# Patient Record
Sex: MALE | Race: WHITE | NOT HISPANIC OR LATINO | Employment: FULL TIME | URBAN - METROPOLITAN AREA
[De-identification: names, ages, dates, MRNs, and addresses within clinical notes are randomized per-mention and may not be internally consistent; named-entity substitution may affect disease eponyms.]

---

## 2023-12-11 ENCOUNTER — APPOINTMENT (OUTPATIENT)
Dept: GENERAL RADIOLOGY | Facility: CLINIC | Age: 38
End: 2023-12-11
Attending: EMERGENCY MEDICINE
Payer: OTHER MISCELLANEOUS

## 2023-12-11 ENCOUNTER — HOSPITAL ENCOUNTER (EMERGENCY)
Facility: CLINIC | Age: 38
Discharge: HOME | End: 2023-12-11
Attending: EMERGENCY MEDICINE | Admitting: EMERGENCY MEDICINE
Payer: OTHER MISCELLANEOUS

## 2023-12-11 VITALS
HEIGHT: 70 IN | HEART RATE: 76 BPM | SYSTOLIC BLOOD PRESSURE: 136 MMHG | RESPIRATION RATE: 16 BRPM | TEMPERATURE: 98.2 F | WEIGHT: 315 LBS | BODY MASS INDEX: 45.1 KG/M2 | DIASTOLIC BLOOD PRESSURE: 82 MMHG | OXYGEN SATURATION: 96 %

## 2023-12-11 DIAGNOSIS — M25.521 RIGHT ELBOW PAIN: ICD-10-CM

## 2023-12-11 PROCEDURE — 250N000013 HC RX MED GY IP 250 OP 250 PS 637: Performed by: EMERGENCY MEDICINE

## 2023-12-11 PROCEDURE — 73080 X-RAY EXAM OF ELBOW: CPT | Mod: RT

## 2023-12-11 PROCEDURE — 73110 X-RAY EXAM OF WRIST: CPT | Mod: RT

## 2023-12-11 PROCEDURE — 99283 EMERGENCY DEPT VISIT LOW MDM: CPT | Performed by: EMERGENCY MEDICINE

## 2023-12-11 PROCEDURE — 99284 EMERGENCY DEPT VISIT MOD MDM: CPT | Performed by: EMERGENCY MEDICINE

## 2023-12-11 PROCEDURE — 73090 X-RAY EXAM OF FOREARM: CPT | Mod: RT

## 2023-12-11 RX ORDER — ACETAMINOPHEN 325 MG/1
975 TABLET ORAL ONCE
Status: DISCONTINUED | OUTPATIENT
Start: 2023-12-11 | End: 2023-12-11 | Stop reason: HOSPADM

## 2023-12-11 RX ORDER — ACETAMINOPHEN 325 MG/1
975 TABLET ORAL ONCE
Status: COMPLETED | OUTPATIENT
Start: 2023-12-11 | End: 2023-12-11

## 2023-12-11 RX ORDER — NAPROXEN 500 MG/1
500 TABLET ORAL ONCE
Status: COMPLETED | OUTPATIENT
Start: 2023-12-11 | End: 2023-12-11

## 2023-12-11 RX ADMIN — NAPROXEN 500 MG: 500 TABLET ORAL at 12:32

## 2023-12-11 ASSESSMENT — ACTIVITIES OF DAILY LIVING (ADL): ADLS_ACUITY_SCORE: 35

## 2023-12-11 NOTE — Clinical Note
Ross Washington was seen and treated in our emergency department on 12/11/2023.  He may return to work on 12/11/2023.  No lifting, holding, or carrying anything over 5lbs with right arm. Wear sling majority of the day.     If you have any questions or concerns, please don't hesitate to call.      Teresa Huggins, APRN CNP

## 2023-12-11 NOTE — ED TRIAGE NOTES
Patient reports he feels that his muscles on his Right arm are twitching after the incident.      Triage Assessment (Adult)       Row Name 12/11/23 1134          Triage Assessment    Airway WDL WDL        Respiratory WDL    Respiratory WDL WDL        Skin Circulation/Temperature WDL    Skin Circulation/Temperature WDL WDL        Cardiac WDL    Cardiac WDL WDL

## 2023-12-11 NOTE — DISCHARGE INSTRUCTIONS
Follow-up with orthopedics. You should receive a call from [facility] to schedule your follow up appointment. If you do not hear from them within 24 business hours, call [phone].    Wear the sling most of the day, take arm out to move it so it doesn't become too stiff. Avoid lifting anything with the right arm that is over 5lbs.

## 2023-12-11 NOTE — ED PROVIDER NOTES
"      [facility] EMERGENCY DEPARTMENT ([facility])    12/11/23      ED PROVIDER NOTE    History     Chief Complaint   Patient presents with    Elbow Injury     Patient was moving a sheet metal at work and heard a lot of popping sound on his Right elbow. Patient reports Right  forearm swelling.     HPI  Ross Washington is a 38 year old male who works with his hands and was lifting a heavy piece of sheet metal above his back when he felt a sudden pop on his right elbow with some pain. No direct impact or fall. Happened just prior to arrival.      This part of the medical record was transcribed by Shaina Moseley Medical Scribe, from a dictation done by Leeroy Stevens MD.     Past Medical History  History reviewed. No pertinent past medical history.  Past Surgical History:   Procedure Laterality Date    ENT SURGERY       No current outpatient medications on file.    No Known Allergies  Family History  History reviewed. No pertinent family history.  Social History   Social History     Tobacco Use    Smoking status: Every Day     Packs/day: .5     Types: Cigarettes   Substance Use Topics    Alcohol use: Yes     Comment: socially    Drug use: No      Past medical history, past surgical history, medications, allergies, family history, and social history were reviewed with the patient. No additional pertinent items.      A medically appropriate review of systems was performed with pertinent positives and negatives noted in the HPI, and all other systems negative.    Physical Exam     BP: 136/82  Pulse: 76  Temp: 98.2  F (36.8  C)  Resp: 16  Height: 177.8 cm (5' 10\")  Weight: (!) 152 kg (335 lb)  SpO2: 96 %    Physical Exam  Patient has sensation and pulses intact, right hand and wrist cap refill normal.   Pain upon supination and pronation as well as flexion of elbow upon his ulnar portion of proximal forearm. Minimal point tenderness on popliteal fossa. No tenderness on olecranon or fingers.   Non-tender " shoulder, normal range of motion.    This part of the medical record was transcribed by Shaina Moseley, Medical Scribe, from a dictation done by Leeroy Stevens MD.     ED Course, Procedures, & Data     ED Course as of 12/11/23 1522   Mon Dec 11, 2023   1328 Patient fitted with sling to right arm, instructed on use including removing periodically throughout day to perform ROM. Instructed to take tylenol and/or ibuprofen for pain as well as apply ice to areas of pain for comfort. Provided with work note and work specific paperwork provided by patient was filled out and returned to him stating 5lb weight restriction right arm. All patient questions answered prior to his discharge.      Procedures                 Results for orders placed or performed during the hospital encounter of 12/11/23   XR Elbow Right G/E 3 Views     Status: None    Narrative    XR ELBOW RIGHT G/E 3 VIEWS   12/11/2023 12:52 PM     HISTORY: Injury, pain  COMPARISON: None.       Impression    IMPRESSION: Normal joint spaces and alignment. No fracture.    CRISTOBAL CASE MD         SYSTEM ID:  [id number]   XR Wrist Right G/E 3 Views     Status: None    Narrative    XR WRIST RIGHT G/E 3 VIEWS   12/11/2023 12:52 PM     HISTORY: Pain  COMPARISON: None.       Impression    IMPRESSION: Likely old healed fracture of the fifth metacarpal base  and the adjacent distal hamate bone with superimposed mild  degenerative arthritis. No acute appearing fracture.    CRISTOBAL CASE MD         SYSTEM ID:  [id number]   Radius/Ulna XR, PA & LAT, right     Status: None    Narrative    XR FOREARM RIGHT 2 VIEWS 12/11/2023 12:52 PM     HISTORY: R atraumatic elbow pain  COMPARISON: None.       Impression    IMPRESSION: Negative forearm. No acute fracture.    CRISTOBAL CASE MD         SYSTEM ID:  [id number]     Medications   acetaminophen (TYLENOL) tablet 975 mg ( Oral Canceled Entry 12/11/23 1236)   acetaminophen (TYLENOL) tablet 975 mg (0 mg Oral Return to  Cabinet 12/11/23 1232)   naproxen (NAPROSYN) tablet 500 mg (500 mg Oral $Given 12/11/23 1232)     Labs Ordered and Resulted from Time of ED Arrival to Time of ED Departure - No data to display  XR Elbow Right G/E 3 Views   Final Result   IMPRESSION: Normal joint spaces and alignment. No fracture.      CRISTOBAL CASE MD            SYSTEM ID:  [id number]      Radius/Ulna XR, PA & LAT, right   Final Result   IMPRESSION: Negative forearm. No acute fracture.      CRISTOBAL CASE MD            SYSTEM ID:  [id number]      XR Wrist Right G/E 3 Views   Final Result   IMPRESSION: Likely old healed fracture of the fifth metacarpal base   and the adjacent distal hamate bone with superimposed mild   degenerative arthritis. No acute appearing fracture.      CRISTOBAL CASE MD            SYSTEM ID:  [id number]             Critical care was not performed.     Medical Decision Making  The patient's presentation was of moderate complexity (an acute complicated injury).    The patient's evaluation involved:  independent interpretation of testing performed by another health professional (x-ray)    The patient's management necessitated moderate risk (prescription drug management including medications given in the ED).    Assessment & Plan      Concern for right elbow dislocation or tenderness, ligamentous rupture, or partial rupture given there were no direct traumatic events. X-rays of right wrist forearm and elbow. Extra strength strength Tylenol and NSAIDs given. I offered opioid pain medication which patient declined.     12:30 PM Reassessment: I independently reviewed the elbow x-ray imaging showing no signs of acute fracture or elbow dislocation. I counseled the patient regarding my concern for a tendinous/ligamentous rupture. We placed the patient in a sling for comfort and provided discharge referral to orthopedics. Counseled patient regarding nonweightbearing behavior and filled out work note. Plan to discharge with  orthopedic follow-up.    This part of the medical record was transcribed by Shaina Moseley, Medical Scribe, from a dictation done by Leeroy Stevens MD.     I have reviewed the nursing notes. I have reviewed the findings, diagnosis, plan and need for follow up with the patient.    There are no discharge medications for this patient.      Final diagnoses:   Right elbow pain     Leeroy Stevens MD  [facility] EMERGENCY DEPARTMENT  12/11/2023     Leeroy Stevens MD  12/11/23 3125

## 2023-12-12 ENCOUNTER — TELEPHONE (OUTPATIENT)
Dept: ORTHOPEDICS | Facility: CLINIC | Age: 38
End: 2023-12-12

## 2023-12-21 ENCOUNTER — OFFICE VISIT (OUTPATIENT)
Dept: ORTHOPEDICS | Facility: CLINIC | Age: 38
End: 2023-12-21
Payer: OTHER MISCELLANEOUS

## 2023-12-21 ENCOUNTER — ANCILLARY PROCEDURE (OUTPATIENT)
Dept: GENERAL RADIOLOGY | Facility: CLINIC | Age: 38
End: 2023-12-21
Attending: PEDIATRICS
Payer: OTHER MISCELLANEOUS

## 2023-12-21 VITALS — BODY MASS INDEX: 48.07 KG/M2 | WEIGHT: 315 LBS

## 2023-12-21 DIAGNOSIS — S59.901A ELBOW INJURY, RIGHT, INITIAL ENCOUNTER: Primary | ICD-10-CM

## 2023-12-21 DIAGNOSIS — S59.901A ELBOW INJURY, RIGHT, INITIAL ENCOUNTER: ICD-10-CM

## 2023-12-21 PROCEDURE — 73080 X-RAY EXAM OF ELBOW: CPT | Mod: TC | Performed by: RADIOLOGY

## 2023-12-21 PROCEDURE — 99204 OFFICE O/P NEW MOD 45 MIN: CPT | Performed by: PEDIATRICS

## 2023-12-21 NOTE — LETTER
December 21, 2023      Ross Washington  [address]  [place] [address]        To Whom It May Concern,     Ross was seen for right elbow injury.  He can return to work with the following restrictions:  - Light duty only  - Minimal use of right arm - No lifting and no repetitive motions with right arm    Obtain MRI and follow up in clinic  Anticipate ~ 3 weeks.      Sincerely,        Thao Sierra MD

## 2023-12-21 NOTE — LETTER
12/21/2023         RE: Ross Washington  [address]  [place] [address]        Dear Colleague,    Thank you for referring your patient, Ross Washington, to the [facility]. Please see a copy of my visit note below.    ASSESSMENT & PLAN    Ross was seen today for injury.    Diagnoses and all orders for this visit:    Elbow injury, right, initial encounter  -     XR Elbow Right G/E 3 Views; Future  -     MR Elbow Right w/o Contrast; Future      This issue is acute and Unchanged.        ICD-10-CM    1. Elbow injury, right, initial encounter  S59.901A XR Elbow Right G/E 3 Views     MR Elbow Right w/o Contrast        Patient Instructions   We discussed these other possible diagnosis: Right elbow injury, given concern for distal biceps injury, will obtain MRI to evaluate. Discussed supportive care in the interim.    Plan:  - Today's Plan of Care:  MRI of the Right Elbow - Call [phone] to schedule MRI   Sling for comfort    Continue with relative rest and activity modification, Ice, Compression, and Elevation.  Can apply ice 10-15 minutes 3-4 times per day as needed. OTC medications as needed.    Work Letter    -We also discussed other future treatment options:  Referral to Orthopedic Surgery  Referral to Occupational Therapy    Follow Up: In clinic with Dr. Sierra after MRI (wait at least 1-2 days)     Concerning signs and symptoms were reviewed and all questions were answered at this time.    Thao Sierra MD [facility]  Sports Medicine Physician  [facility]    -----  Chief Complaint   Patient presents with     Right Elbow - Injury       SUBJECTIVE  Ross Washington is a/an 38 year old male who is seen as an ER referral for evaluation of right elbow pain.   Note; Will need workability note. This is a WC injury     The patient is seen by themselves.  The patient is Right handed    Onset: 12/11/23. Patient describes injury as picking up a sheet of 8x 5  material and with the right arm was more extended, he went to pull it up overhead and felt 3 pops in his elbow  Location of Pain: right elbow; distal bicep, flexor tendon attachment in forearm   Worsened by: elbow, flexion extension, pronation, supination, reaching, and pulling   Better with: leaving arm alone, resting  Treatments tried: previous imaging, and casting/splinting/bracing  Associated symptoms: swelling, weakness of right elbow/forearm, feeling of instability, and muscular deformity    Orthopedic/Surgical history: NO  Social History/Occupation: working as        REVIEW OF SYSTEMS:  Review of Systems    OBJECTIVE:  Wt (!) 152 kg (335 lb)   BMI 48.07 kg/m     General: healthy, alert and in no distress  Skin: no suspicious lesions or rash.  CV: distal perfusion intact   Resp: normal respiratory effort without conversational dyspnea   Psych: normal mood and affect  Gait: NORMAL  Neuro: Normal light sensory exam of upper extremity    Right Elbow exam:    Inspection:     ecchymosis and edema right elbow    Tender:     antecubital space right    Non-Tender:      remainder of the elbow bilaterally    ROM:      Slightly decreased full ROM right elbow    Strength:     flexion 4/5 right       extension 4/5 right       forearm supination 4/5 right       forearm pronation 4/5 right       pain with resisted elbow flexion right    Sensation:     intact throughout hand       intact throughout forearm      RADIOLOGY:  Final results and radiologist's interpretation, available in the [facility] health record.  Images were reviewed with the patient in the office today.  My personal interpretation of the performed imaging:    3 XR views of right elbow reviewed along with ED X-rays (elbow, forearm, wrist): no acute bony abnormality, no significant degenerative change, likely old healed 5th metacarpal fracture  - will follow official read    Reviewed ED note  Review of the result(s) of each unique test - XR              Again, thank you for allowing me to participate in the care of your patient.        Sincerely,        Thao Sierra MD

## 2023-12-21 NOTE — PROGRESS NOTES
ASSESSMENT & PLAN    Ross was seen today for injury.    Diagnoses and all orders for this visit:    Elbow injury, right, initial encounter  -     XR Elbow Right G/E 3 Views; Future  -     MR Elbow Right w/o Contrast; Future      This issue is acute and Unchanged.        ICD-10-CM    1. Elbow injury, right, initial encounter  S59.901A XR Elbow Right G/E 3 Views     MR Elbow Right w/o Contrast        Patient Instructions   We discussed these other possible diagnosis: Right elbow injury, given concern for distal biceps injury, will obtain MRI to evaluate. Discussed supportive care in the interim.    Plan:  - Today's Plan of Care:  MRI of the Right Elbow - Call [phone] to schedule MRI   Sling for comfort    Continue with relative rest and activity modification, Ice, Compression, and Elevation.  Can apply ice 10-15 minutes 3-4 times per day as needed. OTC medications as needed.    Work Letter    -We also discussed other future treatment options:  Referral to Orthopedic Surgery  Referral to Occupational Therapy    Follow Up: In clinic with Dr. Sierra after MRI (wait at least 1-2 days)     Concerning signs and symptoms were reviewed and all questions were answered at this time.    Thao Sierra MD [facility]  Sports Medicine Physician  [facility]    -----  Chief Complaint   Patient presents with    Right Elbow - Injury       SUBJECTIVE  Ross Washington is a/an 38 year old male who is seen as an ER referral for evaluation of right elbow pain.   Note; Will need workability note. This is a WC injury     The patient is seen by themselves.  The patient is Right handed    Onset: 12/11/23. Patient describes injury as picking up a sheet of 8x 5 material and with the right arm was more extended, he went to pull it up overhead and felt 3 pops in his elbow  Location of Pain: right elbow; distal bicep, flexor tendon attachment in forearm   Worsened by: elbow, flexion extension, pronation, supination, reaching,  and pulling   Better with: leaving arm alone, resting  Treatments tried: previous imaging, and casting/splinting/bracing  Associated symptoms: swelling, weakness of right elbow/forearm, feeling of instability, and muscular deformity    Orthopedic/Surgical history: NO  Social History/Occupation: working as        REVIEW OF SYSTEMS:  Review of Systems    OBJECTIVE:  Wt (!) 152 kg (335 lb)   BMI 48.07 kg/m     General: healthy, alert and in no distress  Skin: no suspicious lesions or rash.  CV: distal perfusion intact   Resp: normal respiratory effort without conversational dyspnea   Psych: normal mood and affect  Gait: NORMAL  Neuro: Normal light sensory exam of upper extremity    Right Elbow exam:    Inspection:     ecchymosis and edema right elbow    Tender:     antecubital space right    Non-Tender:      remainder of the elbow bilaterally    ROM:      Slightly decreased full ROM right elbow    Strength:     flexion 4/5 right       extension 4/5 right       forearm supination 4/5 right       forearm pronation 4/5 right       pain with resisted elbow flexion right    Sensation:     intact throughout hand       intact throughout forearm      RADIOLOGY:  Final results and radiologist's interpretation, available in the [facility] health record.  Images were reviewed with the patient in the office today.  My personal interpretation of the performed imaging:    3 XR views of right elbow reviewed along with ED X-rays (elbow, forearm, wrist): no acute bony abnormality, no significant degenerative change, likely old healed 5th metacarpal fracture  - will follow official read    Reviewed ED note  Review of the result(s) of each unique test - XR

## 2023-12-21 NOTE — PATIENT INSTRUCTIONS
We discussed these other possible diagnosis: Right elbow injury, given concern for distal biceps injury, will obtain MRI to evaluate. Discussed supportive care in the interim.    Plan:  - Today's Plan of Care:  MRI of the Right Elbow - Call [phone] to schedule MRI   Sling for comfort    Continue with relative rest and activity modification, Ice, Compression, and Elevation.  Can apply ice 10-15 minutes 3-4 times per day as needed. OTC medications as needed.    Work Letter    -We also discussed other future treatment options:  Referral to Orthopedic Surgery  Referral to Occupational Therapy    Follow Up: In clinic with Dr. Sierra after MRI (wait at least 1-2 days)     If you have any further questions for your physician or physician s care team you can call [phone] and use option 3 to leave a voice message.

## 2023-12-22 ENCOUNTER — TELEPHONE (OUTPATIENT)
Dept: ORTHOPEDICS | Facility: CLINIC | Age: 38
End: 2023-12-22

## 2023-12-22 NOTE — TELEPHONE ENCOUNTER
[facility] Call Center    Phone Message    May a detailed message be left on voicemail: yes     Reason for Call: Other: Patient is calling in because he needs his most recent office visit notes and MRI order sent to his work comp insurance provider via fax at [phone]. Patient's claim number is [id number]     Action Taken: Other: [id number]    Travel Screening: Not Applicable

## 2023-12-26 NOTE — TELEPHONE ENCOUNTER
The requested records were printed out and faxed to [phone].      Evita Rivers, BRETT, LAT, ATC  Certified Athletic Trainer

## 2024-01-10 ENCOUNTER — ANCILLARY PROCEDURE (OUTPATIENT)
Dept: MRI IMAGING | Facility: CLINIC | Age: 39
End: 2024-01-10
Attending: PEDIATRICS
Payer: OTHER MISCELLANEOUS

## 2024-01-10 DIAGNOSIS — S59.901A ELBOW INJURY, RIGHT, INITIAL ENCOUNTER: ICD-10-CM

## 2024-01-10 PROCEDURE — 73221 MRI JOINT UPR EXTREM W/O DYE: CPT | Mod: RT | Performed by: RADIOLOGY

## 2024-01-11 ENCOUNTER — TELEPHONE (OUTPATIENT)
Dept: ORTHOPEDICS | Facility: CLINIC | Age: 39
End: 2024-01-11

## 2024-01-11 DIAGNOSIS — S59.901A ELBOW INJURY, RIGHT, INITIAL ENCOUNTER: Primary | ICD-10-CM

## 2024-01-11 NOTE — TELEPHONE ENCOUNTER
Patient returned the phone call, he is informed of his MRI and next steps.  He is informed of the order as well.    Kathy Chavis ATC, CSCS  Dr. Sierra's Clinical Coordinator  [facility] Medicine Team

## 2024-01-11 NOTE — TELEPHONE ENCOUNTER
Please call patient with MRI results:    MR Elbow Right w/o Contrast  Result Date: 1/11/2024  MRI of right elbow without contrast 1/11/2024 7:27 AM History: eval right elbow bicep rupture; Elbow injury, right, initial encounter Techniques: Multiplanar multisequence imaging through the right elbow were obtained without administration of intravenous gadolinium contrast. Comparison: 12/21/2023 Findings: Medial compartment Ulnar collateral ligament: Thickened ulnar collateral ligament, likely sequela of remote trauma. Common flexor tendon: Intact without tendinosis. Medial epicondyle: No edema. Lateral compartment Radial collateral ligament: Thickening of the radial collateral ligament, likely sequela of remote trauma. Lateral ulnar collateral ligament: Intact. Radial annular ligament: Intact. Common extensor tendon: Tendinosis with low-grade intrasubstance tear at the proximal attachment. Lateral epicondyle: No edema. Posterior compartment Triceps: Intact without tendinosis. Olecranon: No edema. Bursitis: No significant fluid. Anterior compartment Biceps: On a background severe tendinosis and/or strain, high-grade/near complete partial tear of the distal biceps tendon at/adjacent to the distal attachment. Proximal tendon thickening approximately 3 cm from the radial attachment consistent with partial retraction of torn fibers without tendon gap. Mild myotendinous junction edema. Edema along lacertus fibrosus. Reactive edema at the radial tuberosity. Brachialis: Intact. Bicipitoradial bursa: Small fluid, presumably reactive. Articulations No evidence of erosion. No significant effusion. Bones (other than subarticular marrow): No evidence of fracture, marrow contusion or marrow infiltrative change. Others: Nerves: Ulnar nerve and cubital tunnel are normal. 6 mm short axis medial epitrochlear lymph node. Mild posterior subcutaneous edema.   Impression: 1. On a background severe tendinosis and/or strain, high-grade/near  complete partial tear of the distal biceps tendon at/adjacent to the distal attachment. *  Proximal tendon thickening approximately 3 cm from the radial attachment consistent with partial retraction of torn fibers without tendon gap. *  Reactive radial edema. *  Reactive trace bicipitoradial bursitis. *  Lacertus fibrosus strain. *  Myotendinous junction strain.  2. 6 mm short axis medial epitrochlear lymph node, nonspecific. 3. Tendinosis with low-grade intrasubstance tear of the common extensor tendon at the proximal attachment. [employer]   SYSTEM ID:  [id number]      In Summary:  - Given high grade near complete distal biceps tear, would recommend referral to orthopedic surgery  - Please place referral, 3-5 day referral patient should be seen within the next week  - Continue Sling for comfort in the interim  - Let me know if additional work note is needed      Thao Sierra MD

## 2024-01-11 NOTE — TELEPHONE ENCOUNTER
Called, LVM regarding MRI and next steps.       In Summary:  - Given high grade near complete distal biceps tear, would recommend referral to orthopedic surgery  - Please place referral, 3-5 day referral patient should be seen within the next week  - Continue Sling for comfort in the interim  - Let me know if additional work note is needed      Orders are placed for surgical consultation .    Kathy Chavis ATC, CSCS  Dr. Sierra's Clinical Coordinator  [facility] Sports Medicine Team

## 2024-01-12 ENCOUNTER — TELEPHONE (OUTPATIENT)
Dept: ORTHOPEDICS | Facility: CLINIC | Age: 39
End: 2024-01-12

## 2024-01-12 NOTE — TELEPHONE ENCOUNTER
DIAGNOSIS: Elbow injury, right, initial encounter    APPOINTMENT DATE: 1/15/2024    NOTES STATUS DETAILS   OFFICE NOTE from referring provider Internal   Thao Sierra MD      OFFICE NOTE from other specialist Internal    LABS     MRI Internal MRI Elbow 1/10/2024    XRAYS (IMAGES & REPORTS) Internal Xray Elbow 12/21/2023  Xray Elbow 12/11/2023  Xray Forearm 12/11/2023  Xray Wrist 12/11/2023

## 2024-01-12 NOTE — RESULT ENCOUNTER NOTE
Patient was called and results were reviewed over the phone.    Thao Sierra MD CAQ  Primary Care Sports Medicine  [facility]

## 2024-01-15 ENCOUNTER — TELEPHONE (OUTPATIENT)
Dept: ORTHOPEDICS | Facility: CLINIC | Age: 39
End: 2024-01-15

## 2024-01-15 ENCOUNTER — OFFICE VISIT (OUTPATIENT)
Dept: ORTHOPEDICS | Facility: CLINIC | Age: 39
End: 2024-01-15
Attending: PEDIATRICS
Payer: OTHER MISCELLANEOUS

## 2024-01-15 ENCOUNTER — PRE VISIT (OUTPATIENT)
Dept: ORTHOPEDICS | Facility: CLINIC | Age: 39
End: 2024-01-15

## 2024-01-15 DIAGNOSIS — S59.901A ELBOW INJURY, RIGHT, INITIAL ENCOUNTER: ICD-10-CM

## 2024-01-15 DIAGNOSIS — S46.211A RUPTURE OF RIGHT DISTAL BICEPS TENDON, INITIAL ENCOUNTER: Primary | ICD-10-CM

## 2024-01-15 PROCEDURE — 99204 OFFICE O/P NEW MOD 45 MIN: CPT | Performed by: ORTHOPAEDIC SURGERY

## 2024-01-15 NOTE — LETTER
1/15/2024         RE: Ross Washington  [address]  [place] [address]        Dear Colleague,    Thank you for referring your patient, Ross Washington, to the [facility]. Please see a copy of my visit note below.    CHIEF COMPLAINT: right elbow pain     DIAGNOSIS: Right elbow partial high-grade distal biceps tendon tear    OCCUPATION/SPORT:      HPI:  Ross is a very pleasant 38 year old, right-hand dominant male who presents for evaluation of right elbow pain. Symptoms started on 12/11/23. There was a precipitating event where was picking up a sheet of 8x 5 material and with the right arm was more extended, he went to pull it up overhead and felt 3 pops in his elbow. This happened at work and is a work comp injury.  The pain is located to the anterior elbow. He states that he has more an odd feeling instead of pain, and current pain is rated at 0 of 10. Symptoms are worsened by movement and usage. Symptoms are improved with rest. Patient has tried sling with minimal relief. Associated symptoms include tender, pulsing pain, weakness instability and muscle deformity. Notably, the patient has had not had a history of surgery on the elbow. No other concerns or complaints at this time.     PAST MEDICAL HISTORY:  No past medical history on file.    PAST SURGICAL HISTORY:  Past Surgical History:   Procedure Laterality Date    ENT SURGERY         CURRENT MEDICATIONS:  No current outpatient medications on file.       ALLERGIES:    No Known Allergies      FAMILY HISTORY: No pertinent family history, reviewed in EMR.    SOCIAL HISTORY:   Social History     Socioeconomic History    Marital status: Single     Spouse name: Not on file    Number of children: Not on file    Years of education: Not on file    Highest education level: Not on file   Occupational History    Not on file   Tobacco Use    Smoking status: Every Day     Packs/day: .5     Types: Cigarettes     Smokeless tobacco: Not on file   Substance and Sexual Activity    Alcohol use: Yes     Comment: socially    Drug use: No    Sexual activity: Not on file   Other Topics Concern    Not on file   Social History Narrative    Not on file     Social Determinants of Health     Financial Resource Strain: Not on file   Food Insecurity: Not on file   Transportation Needs: Not on file   Physical Activity: Not on file   Stress: Not on file   Social Connections: Not on file   Interpersonal Safety: Not on file   Housing Stability: Not on file       REVIEW OF SYSTEMS: Positive for that noted in past medical history and history of present illness and otherwise reviewed in EMR    PHYSICAL EXAM:  Patient is Data Unavailable and weighs 0 lbs 0 oz. There were no vitals taken for this visit.  Constitutional: Well-developed, well-nourished, healthy appearing male.  Skin: Warm, dry   HEENT: Normal  Cardiac: Well perfused extremities, strong 2+ peripheral pulses. No edema.   Pulmonary: Breathing room air    Musculoskeletal:   Right elbow:  Full passive range of motion from 0 to able to touch the shoulders, full pronation and supination  Can get a hook exam however tender to palpation  4/5 elbow flexion and supination strength  Neurovascular exam and cervical spine exam are normal.    IMAGING:  I personally reviewed the prior x-rays and MRI which demonstrate a high-grade near thickness distal biceps tendon tear     ASSESSMENT/SURGICAL DIAGNOSIS: 38 year old-year-old right hand dominant male with right elbow distal biceps tendon tear  PLAN:  I had a nice discussion with Ross today. I reviewed the diagnosis and prognosis of a distal biceps tendon tear.  We discussed options including conservative versus surgical intervention conservatively we discussed activity modification, avoidance, and therapy.  We discussed risk of this being a 30% loss of elbow flexion strength and 40% loss of supination strength.  We alternatively discussed surgery  in the form of an open distal biceps tendon repair.  We discussed the risk and benefits of surgery as well as the anticipated rehab course.  Specific risks discussed included the risks of tendon retear, infection, LABC injury, PIN injury, stiffness, need for revision surgery.  Especially discussed the increased risks of complications in patients were smoking, extensive time was spent in counseling on smoking cessation.  Patient does wish to proceed for surgery, we discussed doing this in the next few weeks to prevent retraction of the tear, discussed that this is always a possibility of having an irreparable tear.    After going over these options, Ross would like to proceed with right elbow distal biceps repair and related procedures. We reviewed the risks and benefits of surgery including but not limited to the following: Risk of anesthesia, including death; infection; nerve/tendon/vessel injury; deep venous thrombosis (DVT), pulmonary embolism (PE); elbow stiffness, infection requiring implant removal, bleeding requiring transfusion, nerve and blood vessel injury, periprosthetic fracture, implant failure or loosening requiring revision surgery; hardware- related problems; potential for continued pain after surgery; and the potential need for further surgery in the future.     After going over these risks, benefits and alternatives Ross would like to proceed with surgery. All of his questions were answered satisfactorily and he signed the surgical consent form to proceed. All appropriate paperwork was completed and he will work with our surgical scheduling department to coordinate the surgery.    At the conclusion of the office visit, Ross verbally acknowledged that I answered all questions satisfactorily.    Sincerely,        DANIEL CHISHOLM MD

## 2024-01-15 NOTE — TELEPHONE ENCOUNTER
Date Scheduled: 1-30-24  Facility: Surgery Locations: [facility]  Surgeon: Dr. Izaguirre   Post-op appointment scheduled: 2-7-24   scheduled?: No  Surgery packet/instructions confirmed received?  Yes  Pre op physical/PAC appointment: 1/18/24 [place]  Special Considerations:     Work Comp insurance.

## 2024-01-15 NOTE — PROGRESS NOTES
CHIEF COMPLAINT: right elbow pain     DIAGNOSIS: Right elbow partial high-grade distal biceps tendon tear    OCCUPATION/SPORT:      HPI:  Ross is a very pleasant 38 year old, right-hand dominant male who presents for evaluation of right elbow pain. Symptoms started on 12/11/23. There was a precipitating event where was picking up a sheet of 8x 5 material and with the right arm was more extended, he went to pull it up overhead and felt 3 pops in his elbow. This happened at work and is a work comp injury.  The pain is located to the anterior elbow. He states that he has more an odd feeling instead of pain, and current pain is rated at 0 of 10. Symptoms are worsened by movement and usage. Symptoms are improved with rest. Patient has tried sling with minimal relief. Associated symptoms include tender, pulsing pain, weakness instability and muscle deformity. Notably, the patient has had not had a history of surgery on the elbow. No other concerns or complaints at this time.     PAST MEDICAL HISTORY:  No past medical history on file.    PAST SURGICAL HISTORY:  Past Surgical History:   Procedure Laterality Date    ENT SURGERY         CURRENT MEDICATIONS:  No current outpatient medications on file.       ALLERGIES:    No Known Allergies      FAMILY HISTORY: No pertinent family history, reviewed in EMR.    SOCIAL HISTORY:   Social History     Socioeconomic History    Marital status: Single     Spouse name: Not on file    Number of children: Not on file    Years of education: Not on file    Highest education level: Not on file   Occupational History    Not on file   Tobacco Use    Smoking status: Every Day     Packs/day: .5     Types: Cigarettes    Smokeless tobacco: Not on file   Substance and Sexual Activity    Alcohol use: Yes     Comment: socially    Drug use: No    Sexual activity: Not on file   Other Topics Concern    Not on file   Social History Narrative    Not on file     Social Determinants of Health      Financial Resource Strain: Not on file   Food Insecurity: Not on file   Transportation Needs: Not on file   Physical Activity: Not on file   Stress: Not on file   Social Connections: Not on file   Interpersonal Safety: Not on file   Housing Stability: Not on file       REVIEW OF SYSTEMS: Positive for that noted in past medical history and history of present illness and otherwise reviewed in EMR    PHYSICAL EXAM:  Patient is Data Unavailable and weighs 0 lbs 0 oz. There were no vitals taken for this visit.  Constitutional: Well-developed, well-nourished, healthy appearing male.  Skin: Warm, dry   HEENT: Normal  Cardiac: Well perfused extremities, strong 2+ peripheral pulses. No edema.   Pulmonary: Breathing room air    Musculoskeletal:   Right elbow:  Full passive range of motion from 0 to able to touch the shoulders, full pronation and supination  Can get a hook exam however tender to palpation  4/5 elbow flexion and supination strength  Neurovascular exam and cervical spine exam are normal.    IMAGING:  I personally reviewed the prior x-rays and MRI which demonstrate a high-grade near thickness distal biceps tendon tear     ASSESSMENT/SURGICAL DIAGNOSIS: 38 year old-year-old right hand dominant male with right elbow distal biceps tendon tear  PLAN:  I had a nice discussion with Ross today. I reviewed the diagnosis and prognosis of a distal biceps tendon tear.  We discussed options including conservative versus surgical intervention conservatively we discussed activity modification, avoidance, and therapy.  We discussed risk of this being a 30% loss of elbow flexion strength and 40% loss of supination strength.  We alternatively discussed surgery in the form of an open distal biceps tendon repair.  We discussed the risk and benefits of surgery as well as the anticipated rehab course.  Specific risks discussed included the risks of tendon retear, infection, LABC injury, PIN injury, stiffness, need for  revision surgery.  Especially discussed the increased risks of complications in patients were smoking, extensive time was spent in counseling on smoking cessation.  Patient does wish to proceed for surgery, we discussed doing this in the next few weeks to prevent retraction of the tear, discussed that this is always a possibility of having an irreparable tear.    After going over these options, Ross would like to proceed with right elbow distal biceps repair and related procedures. We reviewed the risks and benefits of surgery including but not limited to the following: Risk of anesthesia, including death; infection; nerve/tendon/vessel injury; deep venous thrombosis (DVT), pulmonary embolism (PE); elbow stiffness, infection requiring implant removal, bleeding requiring transfusion, nerve and blood vessel injury, periprosthetic fracture, implant failure or loosening requiring revision surgery; hardware- related problems; potential for continued pain after surgery; and the potential need for further surgery in the future.     After going over these risks, benefits and alternatives Ross would like to proceed with surgery. All of his questions were answered satisfactorily and he signed the surgical consent form to proceed. All appropriate paperwork was completed and he will work with our surgical scheduling department to coordinate the surgery.    At the conclusion of the office visit, Ross verbally acknowledged that I answered all questions satisfactorily.

## 2024-01-18 ENCOUNTER — OFFICE VISIT (OUTPATIENT)
Dept: FAMILY MEDICINE | Facility: CLINIC | Age: 39
End: 2024-01-18
Payer: OTHER MISCELLANEOUS

## 2024-01-18 VITALS
BODY MASS INDEX: 45.1 KG/M2 | WEIGHT: 315 LBS | HEIGHT: 70 IN | SYSTOLIC BLOOD PRESSURE: 138 MMHG | TEMPERATURE: 98.2 F | OXYGEN SATURATION: 98 % | DIASTOLIC BLOOD PRESSURE: 83 MMHG | RESPIRATION RATE: 16 BRPM | HEART RATE: 78 BPM

## 2024-01-18 DIAGNOSIS — Z01.818 PREOP GENERAL PHYSICAL EXAM: Primary | ICD-10-CM

## 2024-01-18 DIAGNOSIS — S46.201A UNSPECIFIED INJURY OF MUSCLE, FASCIA AND TENDON OF OTHER PARTS OF BICEPS, RIGHT ARM, INITIAL ENCOUNTER: ICD-10-CM

## 2024-01-18 LAB
ERYTHROCYTE [DISTWIDTH] IN BLOOD BY AUTOMATED COUNT: 13 % (ref 10–15)
HCT VFR BLD AUTO: 44.4 % (ref 40–53)
HGB BLD-MCNC: 15 G/DL (ref 13.3–17.7)
MCH RBC QN AUTO: 29 PG (ref 26.5–33)
MCHC RBC AUTO-ENTMCNC: 33.8 G/DL (ref 31.5–36.5)
MCV RBC AUTO: 86 FL (ref 78–100)
PLATELET # BLD AUTO: 324 10E3/UL (ref 150–450)
RBC # BLD AUTO: 5.18 10E6/UL (ref 4.4–5.9)
WBC # BLD AUTO: 8.2 10E3/UL (ref 4–11)

## 2024-01-18 PROCEDURE — 85027 COMPLETE CBC AUTOMATED: CPT | Performed by: FAMILY MEDICINE

## 2024-01-18 PROCEDURE — 99204 OFFICE O/P NEW MOD 45 MIN: CPT | Performed by: FAMILY MEDICINE

## 2024-01-18 PROCEDURE — 36415 COLL VENOUS BLD VENIPUNCTURE: CPT | Performed by: FAMILY MEDICINE

## 2024-01-18 PROCEDURE — 80048 BASIC METABOLIC PNL TOTAL CA: CPT | Performed by: FAMILY MEDICINE

## 2024-01-18 NOTE — PATIENT INSTRUCTIONS
Preparing for Your Surgery  Getting started  A nurse will call you to review your health history and instructions. They will give you an arrival time based on your scheduled surgery time. Please be ready to share:  Your doctor's clinic name and phone number  Your medical, surgical, and anesthesia history  A list of allergies and sensitivities  A list of medicines, including herbal treatments and over-the-counter drugs  Whether the patient has a legal guardian (ask how to send us the papers in advance)  Please tell us if you're pregnant--or if there's any chance you might be pregnant. Some surgeries may injure a fetus (unborn baby), so they require a pregnancy test. Surgeries that are safe for a fetus don't always need a test, and you can choose whether to have one.   If you have a child who's having surgery, please ask for a copy of Preparing for Your Child's Surgery.    Preparing for surgery  Within 10 to 30 days of surgery: Have a pre-op exam (sometimes called an H&P, or History and Physical). This can be done at a clinic or pre-operative center.  If you're having a , you may not need this exam. Talk to your care team.  At your pre-op exam, talk to your care team about all medicines you take. If you need to stop any medicines before surgery, ask when to start taking them again.  We do this for your safety. Many medicines can make you bleed too much during surgery. Some change how well surgery (anesthesia) drugs work.  Call your insurance company to let them know you're having surgery. (If you don't have insurance, call [phone].)  Call your clinic if there's any change in your health. This includes signs of a cold or flu (sore throat, runny nose, cough, rash, fever). It also includes a scrape or scratch near the surgery site.  If you have questions on the day of surgery, call your hospital or surgery center.  Eating and drinking guidelines  For your safety: Unless your surgeon tells you otherwise,  follow the guidelines below.  Eat and drink as usual until 8 hours before you arrive for surgery. After that, no food or milk.  Drink clear liquids until 2 hours before you arrive. These are liquids you can see through, like water, Gatorade, and Propel Water. They also include plain black coffee and tea (no cream or milk), candy, and breath mints. You can spit out gum when you arrive.  If you drink alcohol: Stop drinking it the night before surgery.  If your care team tells you to take medicine on the morning of surgery, it's okay to take it with a sip of water.  Preventing infection  Shower or bathe the night before and morning of your surgery. Follow the instructions your clinic gave you. (If no instructions, use regular soap.)  Don't shave or clip hair near your surgery site. We'll remove the hair if needed.  Don't smoke or vape the morning of surgery. You may chew nicotine gum up to 2 hours before surgery. A nicotine patch is okay.  Note: Some surgeries require you to completely quit smoking and nicotine. Check with your surgeon.  Your care team will make every effort to keep you safe from infection. We will:  Clean our hands often with soap and water (or an alcohol-based hand rub).  Clean the skin at your surgery site with a special soap that kills germs.  Give you a special gown to keep you warm. (Cold raises the risk of infection.)  Wear special hair covers, masks, gowns and gloves during surgery.  Give antibiotic medicine, if prescribed. Not all surgeries need antibiotics.  What to bring on the day of surgery  Photo ID and insurance card  Copy of your health care directive, if you have one  Glasses and hearing aids (bring cases)  You can't wear contacts during surgery  Inhaler and eye drops, if you use them (tell us about these when you arrive)  CPAP machine or breathing device, if you use them  A few personal items, if spending the night  If you have . . .  A pacemaker, ICD (cardiac defibrillator) or other  implant: Bring the ID card.  An implanted stimulator: Bring the remote control.  A legal guardian: Bring a copy of the certified (court-stamped) guardianship papers.  Please remove any jewelry, including body piercings. Leave jewelry and other valuables at home.  If you're going home the day of surgery  You must have a responsible adult drive you home. They should stay with you overnight as well.  If you don't have someone to stay with you, and you aren't safe to go home alone, we may keep you overnight. Insurance often won't pay for this.  After surgery  If it's hard to control your pain or you need more pain medicine, please call your surgeon's office.  Questions?   If you have any questions for your care team, list them here: _________________________________________________________________________________________________________________________________________________________________________ ____________________________________ ____________________________________ ____________________________________  For informational purposes only. Not to replace the advice of your health care provider. Copyright   2003, 2019 [facility]. All rights reserved. Clinically reviewed by Mouna Gonzalez MD. [employer] 565551 - REV 12/22.

## 2024-01-18 NOTE — PROGRESS NOTES
Preoperative Evaluation  [facility]  [address]  [place] [address]  Phone: [phone]  Primary Provider: Saran Welch DO  Pre-op Performing Provider: SARAN WELCH  Jan 18, 2024       Ross is a 38 year old, presenting for the following:  Pre-Op Exam (Work Comp)        1/18/2024     3:43 PM   Additional Questions   Roomed by Bekah ESCALONA CMA     Surgical Information  Surgery/Procedure: Repair, tendon Bicep Distal Right  Surgery Location: [facility]  Surgeon: Dr. Izaguirre  Surgery Date: 1/30/2024  Time of Surgery: 11:30am  Where patient plans to recover: At home with family  Fax number for surgical facility: Note does not need to be faxed, will be available electronically in Epic.    Assessment & Plan     The proposed surgical procedure is considered INTERMEDIATE risk.    Problem List Items Addressed This Visit    None  Visit Diagnoses       Preop general physical exam    -  Primary    Unspecified injury of muscle, fascia and tendon of other parts of biceps, right arm, initial encounter              I did order baseline BMP, CBC as he has not had any medical care for many years, and BMI 51        - No identified additional risk factors other than previously addressed    Antiplatelet or Anticoagulation Medication Instructions   - Patient is on no antiplatelet or anticoagulation medications.    Additional Medication Instructions  Patient is on no additional chronic medications    Recommendation  APPROVAL GIVEN to proceed with proposed procedure, without further diagnostic evaluation.          Subjective       HPI related to upcoming procedure: Bicep injury lifting a sheet of metal Dec 11, 2023, requires surgical correction        1/18/2024     3:36 PM   Preop Questions   1. Have you ever had a heart attack or stroke? No   2. Have you ever had surgery on your heart or blood vessels, such as a stent placement, a coronary artery bypass, or surgery on an artery  in your head, neck, heart, or legs? No   3. Do you have chest pain with activity? No   4. Do you have a history of  heart failure? No   5. Do you currently have a cold, bronchitis or symptoms of other infection? No   6. Do you have a cough, shortness of breath, or wheezing? No   7. Do you or anyone in your family have previous history of blood clots? No   8. Do you or does anyone in your family have a serious bleeding problem such as prolonged bleeding following surgeries or cuts? No   9. Have you ever had problems with anemia or been told to take iron pills? No   10. Have you had any abnormal blood loss such as black, tarry or bloody stools? No   11. Have you ever had a blood transfusion? No   12. Are you willing to have a blood transfusion if it is medically needed before, during, or after your surgery? NO -    13. Have you or any of your relatives ever had problems with anesthesia? No   14. Do you have sleep apnea, excessive snoring or daytime drowsiness? UNKNOWN -    15. Do you have any artifical heart valves or other implanted medical devices like a pacemaker, defibrillator, or continuous glucose monitor? No   16. Do you have artificial joints? No   17. Are you allergic to latex? No       Health Care Directive  Patient does not have a Health Care Directive or Living Will: Discussed advance care planning with patient; information given to patient to review.    Preoperative Review of    reviewed - no record of controlled substances prescribed.        There are no problems to display for this patient.     No past medical history on file.  Past Surgical History:   Procedure Laterality Date    ENT SURGERY       No current outpatient medications on file.       No Known Allergies     Social History     Tobacco Use    Smoking status: Every Day     Packs/day: .5     Types: Cigarettes    Smokeless tobacco: Not on file   Substance Use Topics    Alcohol use: Yes     Comment: socially       History   Drug Use No        "  Review of Systems    Objective    /83 (BP Location: Left arm, Patient Position: Chair, Cuff Size: Adult Large)   Pulse 78   Temp 98.2  F (36.8  C) (Temporal)   Resp 16   Ht 1.778 m (5' 10\")   Wt (!) 161.5 kg (356 lb)   SpO2 98%   BMI 51.08 kg/m     Estimated body mass index is 51.08 kg/m  as calculated from the following:    Height as of this encounter: 1.778 m (5' 10\").    Weight as of this encounter: 161.5 kg (356 lb).  Physical Exam  GENERAL: alert and no distress  NECK: no adenopathy, no asymmetry, masses, or scars  RESP: lungs clear to auscultation - no rales, rhonchi or wheezes  CV: regular rate and rhythm, normal S1 S2, no S3 or S4, no murmur, click or rub, no peripheral edema  ABDOMEN: soft, nontender, no hepatosplenomegaly, no masses and bowel sounds normal  MS: no gross musculoskeletal defects noted, no edema    No results for input(s): \"HGB\", \"PLT\", \"INR\", \"NA\", \"POTASSIUM\", \"CR\", \"A1C\" in the last 55571 hours.     Diagnostics  Labs pending at this time.  Results will be reviewed when available.   No EKG required, no history of coronary heart disease, significant arrhythmia, peripheral arterial disease or other structural heart disease.    Revised Cardiac Risk Index (RCRI)  The patient has the following serious cardiovascular risks for perioperative complications:   - No serious cardiac risks = 0 points     RCRI Interpretation: 0 points: Class I (very low risk - 0.4% complication rate)         Signed Electronically by: JANET GROVE DO  Copy of this evaluation report is provided to requesting physician.         "

## 2024-01-20 LAB
ANION GAP SERPL CALCULATED.3IONS-SCNC: 10 MMOL/L (ref 7–15)
BUN SERPL-MCNC: 11.7 MG/DL (ref 6–20)
CALCIUM SERPL-MCNC: 9.5 MG/DL (ref 8.6–10)
CHLORIDE SERPL-SCNC: 105 MMOL/L (ref 98–107)
CREAT SERPL-MCNC: 0.9 MG/DL (ref 0.67–1.17)
DEPRECATED HCO3 PLAS-SCNC: 26 MMOL/L (ref 22–29)
EGFRCR SERPLBLD CKD-EPI 2021: >90 ML/MIN/1.73M2
GLUCOSE SERPL-MCNC: 88 MG/DL (ref 70–99)
POTASSIUM SERPL-SCNC: 4.3 MMOL/L (ref 3.4–5.3)
SODIUM SERPL-SCNC: 141 MMOL/L (ref 135–145)

## 2024-01-29 ENCOUNTER — ANESTHESIA EVENT (OUTPATIENT)
Dept: SURGERY | Facility: CLINIC | Age: 39
End: 2024-01-29
Payer: OTHER MISCELLANEOUS

## 2024-01-29 NOTE — ANESTHESIA PREPROCEDURE EVALUATION
"Anesthesia Pre-Procedure Evaluation    Patient: Ross Washington   MRN: [id number] : 1985        Procedure : Procedure(s):  REPAIR, TENDON, BICEPS, DISTAL RIGHT          No past medical history on file.   Past Surgical History:   Procedure Laterality Date    ENT SURGERY        No Known Allergies   Social History     Tobacco Use    Smoking status: Every Day     Packs/day: .5     Types: Cigarettes    Smokeless tobacco: Not on file   Substance Use Topics    Alcohol use: Yes     Comment: socially      Wt Readings from Last 1 Encounters:   24 (!) 161.5 kg (356 lb)        Anesthesia Evaluation            ROS/MED HX  ENT/Pulmonary:  - neg pulmonary ROS     Neurologic:  - neg neurologic ROS     Cardiovascular:  - neg cardiovascular ROS     METS/Exercise Tolerance: >4 METS    Hematologic:  - neg hematologic  ROS     Musculoskeletal:  - neg musculoskeletal ROS     GI/Hepatic:  - neg GI/hepatic ROS     Renal/Genitourinary:  - neg Renal ROS     Endo:  - neg endo ROS   (+)               Obesity,       Psychiatric/Substance Use:  - neg psychiatric ROS     Infectious Disease:  - neg infectious disease ROS     Malignancy:  - neg malignancy ROS     Other:  - neg other ROS             OUTSIDE LABS:  CBC:   Lab Results   Component Value Date    WBC 8.2 2024    HGB 15.0 2024    HCT 44.4 2024     2024     BMP:   Lab Results   Component Value Date     2024    POTASSIUM 4.3 2024    CHLORIDE 105 2024    CO2 26 2024    BUN 11.7 2024    CR 0.90 2024    GLC 88 2024     COAGS: No results found for: \"PTT\", \"INR\", \"FIBR\"  POC: No results found for: \"BGM\", \"HCG\", \"HCGS\"  HEPATIC: No results found for: \"ALBUMIN\", \"PROTTOTAL\", \"ALT\", \"AST\", \"GGT\", \"ALKPHOS\", \"BILITOTAL\", \"BILIDIRECT\", \"PRASHANTH\"  OTHER:   Lab Results   Component Value Date    EVELIN 9.5 2024       Anesthesia Plan    ASA Status:  3    NPO Status:  NPO Appropriate    Anesthesia Type: " "General.     - Airway: ETT   Induction: Intravenous.   Maintenance: Inhalation.        Consents            Postoperative Care    Pain management: Oral pain medications, Multi-modal analgesia, Peripheral nerve block (Single Shot).   PONV prophylaxis: Ondansetron (or other 5HT-3), Dexamethasone or Solumedrol     Comments:               Danny Frazier MD    I have reviewed the pertinent notes and labs in the chart from the past 30 days and (re)examined the patient.  Any updates or changes from those notes are reflected in this note.              # Severe Obesity: Estimated body mass index is 51.08 kg/m  as calculated from the following:    Height as of 1/18/24: 1.778 m (5' 10\").    Weight as of 1/18/24: 161.5 kg (356 lb).      "

## 2024-01-30 ENCOUNTER — HOSPITAL ENCOUNTER (OUTPATIENT)
Facility: CLINIC | Age: 39
Discharge: HOME | End: 2024-01-30
Attending: ORTHOPAEDIC SURGERY | Admitting: ORTHOPAEDIC SURGERY
Payer: OTHER MISCELLANEOUS

## 2024-01-30 ENCOUNTER — ANESTHESIA (OUTPATIENT)
Dept: SURGERY | Facility: CLINIC | Age: 39
End: 2024-01-30
Payer: OTHER MISCELLANEOUS

## 2024-01-30 VITALS
OXYGEN SATURATION: 93 % | RESPIRATION RATE: 19 BRPM | DIASTOLIC BLOOD PRESSURE: 84 MMHG | TEMPERATURE: 97.5 F | HEART RATE: 96 BPM | HEIGHT: 71 IN | WEIGHT: 315 LBS | SYSTOLIC BLOOD PRESSURE: 143 MMHG | BODY MASS INDEX: 44.1 KG/M2

## 2024-01-30 DIAGNOSIS — S46.211A RUPTURE OF RIGHT DISTAL BICEPS TENDON, INITIAL ENCOUNTER: Primary | ICD-10-CM

## 2024-01-30 PROCEDURE — 250N000009 HC RX 250

## 2024-01-30 PROCEDURE — 250N000011 HC RX IP 250 OP 636: Performed by: ORTHOPAEDIC SURGERY

## 2024-01-30 PROCEDURE — 250N000011 HC RX IP 250 OP 636

## 2024-01-30 PROCEDURE — 710N000010 HC RECOVERY PHASE 1, LEVEL 2, PER MIN: Performed by: ORTHOPAEDIC SURGERY

## 2024-01-30 PROCEDURE — 258N000003 HC RX IP 258 OP 636

## 2024-01-30 PROCEDURE — 250N000013 HC RX MED GY IP 250 OP 250 PS 637

## 2024-01-30 PROCEDURE — 24342 REPAIR OF RUPTURED TENDON: CPT | Mod: GC | Performed by: ORTHOPAEDIC SURGERY

## 2024-01-30 PROCEDURE — 272N000002 HC OR SUPPLY OTHER OPNP: Performed by: ORTHOPAEDIC SURGERY

## 2024-01-30 PROCEDURE — 250N000011 HC RX IP 250 OP 636: Performed by: STUDENT IN AN ORGANIZED HEALTH CARE EDUCATION/TRAINING PROGRAM

## 2024-01-30 PROCEDURE — 370N000017 HC ANESTHESIA TECHNICAL FEE, PER MIN: Performed by: ORTHOPAEDIC SURGERY

## 2024-01-30 PROCEDURE — 710N000012 HC RECOVERY PHASE 2, PER MINUTE: Performed by: ORTHOPAEDIC SURGERY

## 2024-01-30 PROCEDURE — 250N000025 HC SEVOFLURANE, PER MIN: Performed by: ORTHOPAEDIC SURGERY

## 2024-01-30 PROCEDURE — 360N000083 HC SURGERY LEVEL 3 W/ FLUORO, PER MIN: Performed by: ORTHOPAEDIC SURGERY

## 2024-01-30 PROCEDURE — C1713 ANCHOR/SCREW BN/BN,TIS/BN: HCPCS | Performed by: ORTHOPAEDIC SURGERY

## 2024-01-30 PROCEDURE — 999N000141 HC STATISTIC PRE-PROCEDURE NURSING ASSESSMENT: Performed by: ORTHOPAEDIC SURGERY

## 2024-01-30 PROCEDURE — 272N000001 HC OR GENERAL SUPPLY STERILE: Performed by: ORTHOPAEDIC SURGERY

## 2024-01-30 PROCEDURE — C9290 INJ, BUPIVACAINE LIPOSOME: HCPCS | Performed by: STUDENT IN AN ORGANIZED HEALTH CARE EDUCATION/TRAINING PROGRAM

## 2024-01-30 PROCEDURE — 250N000009 HC RX 250: Performed by: ORTHOPAEDIC SURGERY

## 2024-01-30 DEVICE — SUTR ANCH,BIO-COMP P-LCK,2.9X 12.5MM
Type: IMPLANTABLE DEVICE | Site: ARM | Status: FUNCTIONAL
Brand: ARTHREX®

## 2024-01-30 DEVICE — IMPLANT SYSTEM, FIBERTAK BUTTON
Type: IMPLANTABLE DEVICE | Site: ARM | Status: FUNCTIONAL
Brand: ARTHREX®

## 2024-01-30 DEVICE — SUTURETAPE FIBERLOOP W/ NDL WH/BL
Type: IMPLANTABLE DEVICE | Site: ARM | Status: FUNCTIONAL
Brand: ARTHREX®

## 2024-01-30 RX ORDER — SODIUM CHLORIDE, SODIUM LACTATE, POTASSIUM CHLORIDE, CALCIUM CHLORIDE 600; 310; 30; 20 MG/100ML; MG/100ML; MG/100ML; MG/100ML
INJECTION, SOLUTION INTRAVENOUS CONTINUOUS
Status: DISCONTINUED | OUTPATIENT
Start: 2024-01-30 | End: 2024-01-30 | Stop reason: HOSPADM

## 2024-01-30 RX ORDER — LIDOCAINE 40 MG/G
CREAM TOPICAL
Status: DISCONTINUED | OUTPATIENT
Start: 2024-01-30 | End: 2024-01-30 | Stop reason: HOSPADM

## 2024-01-30 RX ORDER — ONDANSETRON 2 MG/ML
4 INJECTION INTRAMUSCULAR; INTRAVENOUS EVERY 30 MIN PRN
Status: DISCONTINUED | OUTPATIENT
Start: 2024-01-30 | End: 2024-01-30 | Stop reason: HOSPADM

## 2024-01-30 RX ORDER — HYDROMORPHONE HYDROCHLORIDE 1 MG/ML
0.2 INJECTION, SOLUTION INTRAMUSCULAR; INTRAVENOUS; SUBCUTANEOUS EVERY 5 MIN PRN
Status: DISCONTINUED | OUTPATIENT
Start: 2024-01-30 | End: 2024-01-30 | Stop reason: HOSPADM

## 2024-01-30 RX ORDER — FLUMAZENIL 0.1 MG/ML
0.2 INJECTION, SOLUTION INTRAVENOUS
Status: DISCONTINUED | OUTPATIENT
Start: 2024-01-30 | End: 2024-01-30 | Stop reason: HOSPADM

## 2024-01-30 RX ORDER — ONDANSETRON 4 MG/1
4 TABLET, ORALLY DISINTEGRATING ORAL EVERY 30 MIN PRN
Status: DISCONTINUED | OUTPATIENT
Start: 2024-01-30 | End: 2024-01-30 | Stop reason: HOSPADM

## 2024-01-30 RX ORDER — NALOXONE HYDROCHLORIDE 0.4 MG/ML
0.2 INJECTION, SOLUTION INTRAMUSCULAR; INTRAVENOUS; SUBCUTANEOUS
Status: DISCONTINUED | OUTPATIENT
Start: 2024-01-30 | End: 2024-01-30 | Stop reason: HOSPADM

## 2024-01-30 RX ORDER — ONDANSETRON 4 MG/1
4 TABLET, ORALLY DISINTEGRATING ORAL EVERY 8 HOURS PRN
Qty: 10 TABLET | Refills: 0 | Status: SHIPPED | OUTPATIENT
Start: 2024-01-30

## 2024-01-30 RX ORDER — CEFAZOLIN SODIUM/WATER 3 G/30 ML
3 SYRINGE (ML) INTRAVENOUS SEE ADMIN INSTRUCTIONS
Status: DISCONTINUED | OUTPATIENT
Start: 2024-01-30 | End: 2024-01-30 | Stop reason: HOSPADM

## 2024-01-30 RX ORDER — FENTANYL CITRATE 50 UG/ML
25 INJECTION, SOLUTION INTRAMUSCULAR; INTRAVENOUS EVERY 5 MIN PRN
Status: DISCONTINUED | OUTPATIENT
Start: 2024-01-30 | End: 2024-01-30 | Stop reason: HOSPADM

## 2024-01-30 RX ORDER — FENTANYL CITRATE 50 UG/ML
INJECTION, SOLUTION INTRAMUSCULAR; INTRAVENOUS PRN
Status: DISCONTINUED | OUTPATIENT
Start: 2024-01-30 | End: 2024-01-30

## 2024-01-30 RX ORDER — NALOXONE HYDROCHLORIDE 0.4 MG/ML
0.4 INJECTION, SOLUTION INTRAMUSCULAR; INTRAVENOUS; SUBCUTANEOUS
Status: DISCONTINUED | OUTPATIENT
Start: 2024-01-30 | End: 2024-01-30 | Stop reason: HOSPADM

## 2024-01-30 RX ORDER — IPRATROPIUM BROMIDE AND ALBUTEROL SULFATE 2.5; .5 MG/3ML; MG/3ML
3 SOLUTION RESPIRATORY (INHALATION)
Status: COMPLETED | OUTPATIENT
Start: 2024-01-30 | End: 2024-01-30

## 2024-01-30 RX ORDER — SODIUM CHLORIDE, SODIUM LACTATE, POTASSIUM CHLORIDE, CALCIUM CHLORIDE 600; 310; 30; 20 MG/100ML; MG/100ML; MG/100ML; MG/100ML
INJECTION, SOLUTION INTRAVENOUS CONTINUOUS PRN
Status: DISCONTINUED | OUTPATIENT
Start: 2024-01-30 | End: 2024-01-30

## 2024-01-30 RX ORDER — ONDANSETRON 2 MG/ML
INJECTION INTRAMUSCULAR; INTRAVENOUS PRN
Status: DISCONTINUED | OUTPATIENT
Start: 2024-01-30 | End: 2024-01-30

## 2024-01-30 RX ORDER — DIMENHYDRINATE 50 MG/ML
25 INJECTION, SOLUTION INTRAMUSCULAR; INTRAVENOUS
Status: DISCONTINUED | OUTPATIENT
Start: 2024-01-30 | End: 2024-01-30 | Stop reason: HOSPADM

## 2024-01-30 RX ORDER — MAGNESIUM HYDROXIDE 1200 MG/15ML
LIQUID ORAL PRN
Status: DISCONTINUED | OUTPATIENT
Start: 2024-01-30 | End: 2024-01-30 | Stop reason: HOSPADM

## 2024-01-30 RX ORDER — CEFAZOLIN SODIUM/WATER 3 G/30 ML
3 SYRINGE (ML) INTRAVENOUS
Status: COMPLETED | OUTPATIENT
Start: 2024-01-30 | End: 2024-01-30

## 2024-01-30 RX ORDER — LIDOCAINE HYDROCHLORIDE 20 MG/ML
INJECTION, SOLUTION INFILTRATION; PERINEURAL PRN
Status: DISCONTINUED | OUTPATIENT
Start: 2024-01-30 | End: 2024-01-30

## 2024-01-30 RX ORDER — HYDRALAZINE HYDROCHLORIDE 20 MG/ML
2.5-5 INJECTION INTRAMUSCULAR; INTRAVENOUS EVERY 10 MIN PRN
Status: DISCONTINUED | OUTPATIENT
Start: 2024-01-30 | End: 2024-01-30 | Stop reason: HOSPADM

## 2024-01-30 RX ORDER — DEXAMETHASONE SODIUM PHOSPHATE 4 MG/ML
INJECTION, SOLUTION INTRA-ARTICULAR; INTRALESIONAL; INTRAMUSCULAR; INTRAVENOUS; SOFT TISSUE PRN
Status: DISCONTINUED | OUTPATIENT
Start: 2024-01-30 | End: 2024-01-30

## 2024-01-30 RX ORDER — OXYCODONE HYDROCHLORIDE 5 MG/1
5 TABLET ORAL
Status: COMPLETED | OUTPATIENT
Start: 2024-01-30 | End: 2024-01-30

## 2024-01-30 RX ORDER — HYDROMORPHONE HYDROCHLORIDE 1 MG/ML
0.4 INJECTION, SOLUTION INTRAMUSCULAR; INTRAVENOUS; SUBCUTANEOUS EVERY 5 MIN PRN
Status: DISCONTINUED | OUTPATIENT
Start: 2024-01-30 | End: 2024-01-30 | Stop reason: HOSPADM

## 2024-01-30 RX ORDER — ACETAMINOPHEN 500 MG
1000 TABLET ORAL ONCE
Status: COMPLETED | OUTPATIENT
Start: 2024-01-30 | End: 2024-01-30

## 2024-01-30 RX ORDER — FENTANYL CITRATE 50 UG/ML
25-50 INJECTION, SOLUTION INTRAMUSCULAR; INTRAVENOUS
Status: DISCONTINUED | OUTPATIENT
Start: 2024-01-30 | End: 2024-01-30 | Stop reason: HOSPADM

## 2024-01-30 RX ORDER — LABETALOL HYDROCHLORIDE 5 MG/ML
10 INJECTION, SOLUTION INTRAVENOUS
Status: DISCONTINUED | OUTPATIENT
Start: 2024-01-30 | End: 2024-01-30 | Stop reason: HOSPADM

## 2024-01-30 RX ORDER — OXYCODONE HYDROCHLORIDE 5 MG/1
10 TABLET ORAL
Status: DISCONTINUED | OUTPATIENT
Start: 2024-01-30 | End: 2024-01-30 | Stop reason: HOSPADM

## 2024-01-30 RX ORDER — BUPIVACAINE HYDROCHLORIDE 2.5 MG/ML
INJECTION, SOLUTION EPIDURAL; INFILTRATION; INTRACAUDAL
Status: COMPLETED | OUTPATIENT
Start: 2024-01-30 | End: 2024-01-30

## 2024-01-30 RX ORDER — HYDROCODONE BITARTRATE AND ACETAMINOPHEN 5; 325 MG/1; MG/1
1 TABLET ORAL EVERY 6 HOURS PRN
Qty: 10 TABLET | Refills: 0 | Status: SHIPPED | OUTPATIENT
Start: 2024-01-30 | End: 2024-02-02

## 2024-01-30 RX ORDER — GABAPENTIN 100 MG/1
100 CAPSULE ORAL 3 TIMES DAILY
Qty: 21 CAPSULE | Refills: 0 | Status: SHIPPED | OUTPATIENT
Start: 2024-01-30

## 2024-01-30 RX ORDER — ALBUTEROL SULFATE 0.83 MG/ML
2.5 SOLUTION RESPIRATORY (INHALATION) EVERY 4 HOURS PRN
Status: DISCONTINUED | OUTPATIENT
Start: 2024-01-30 | End: 2024-01-30 | Stop reason: HOSPADM

## 2024-01-30 RX ORDER — PROPOFOL 10 MG/ML
INJECTION, EMULSION INTRAVENOUS PRN
Status: DISCONTINUED | OUTPATIENT
Start: 2024-01-30 | End: 2024-01-30

## 2024-01-30 RX ORDER — DOCUSATE SODIUM 100 MG/1
100 CAPSULE, LIQUID FILLED ORAL 2 TIMES DAILY
Qty: 30 CAPSULE | Refills: 0 | Status: SHIPPED | OUTPATIENT
Start: 2024-01-30

## 2024-01-30 RX ORDER — CELECOXIB 100 MG/1
100 CAPSULE ORAL 2 TIMES DAILY
Qty: 30 CAPSULE | Refills: 0 | Status: SHIPPED | OUTPATIENT
Start: 2024-01-30

## 2024-01-30 RX ORDER — FENTANYL CITRATE 50 UG/ML
50 INJECTION, SOLUTION INTRAMUSCULAR; INTRAVENOUS EVERY 5 MIN PRN
Status: DISCONTINUED | OUTPATIENT
Start: 2024-01-30 | End: 2024-01-30 | Stop reason: HOSPADM

## 2024-01-30 RX ADMIN — FENTANYL CITRATE 50 MCG: 50 INJECTION, SOLUTION INTRAMUSCULAR; INTRAVENOUS at 07:16

## 2024-01-30 RX ADMIN — PROPOFOL 60 MG: 10 INJECTION, EMULSION INTRAVENOUS at 09:20

## 2024-01-30 RX ADMIN — ROCURONIUM BROMIDE 70 MG: 10 INJECTION, SOLUTION INTRAVENOUS at 07:58

## 2024-01-30 RX ADMIN — Medication 3 G: at 07:49

## 2024-01-30 RX ADMIN — DEXAMETHASONE SODIUM PHOSPHATE 4 MG: 4 INJECTION, SOLUTION INTRA-ARTICULAR; INTRALESIONAL; INTRAMUSCULAR; INTRAVENOUS; SOFT TISSUE at 08:27

## 2024-01-30 RX ADMIN — LIDOCAINE HYDROCHLORIDE 100 MG: 20 INJECTION, SOLUTION INFILTRATION; PERINEURAL at 07:56

## 2024-01-30 RX ADMIN — OXYCODONE HYDROCHLORIDE 5 MG: 5 TABLET ORAL at 11:15

## 2024-01-30 RX ADMIN — PROPOFOL 20 MG: 10 INJECTION, EMULSION INTRAVENOUS at 09:49

## 2024-01-30 RX ADMIN — BUPIVACAINE HYDROCHLORIDE 10 ML: 2.5 INJECTION, SOLUTION EPIDURAL; INFILTRATION; INTRACAUDAL at 07:24

## 2024-01-30 RX ADMIN — ROCURONIUM BROMIDE 10 MG: 10 INJECTION, SOLUTION INTRAVENOUS at 09:20

## 2024-01-30 RX ADMIN — ONDANSETRON 4 MG: 2 INJECTION INTRAMUSCULAR; INTRAVENOUS at 09:37

## 2024-01-30 RX ADMIN — FENTANYL CITRATE 25 MCG: 50 INJECTION INTRAMUSCULAR; INTRAVENOUS at 11:05

## 2024-01-30 RX ADMIN — FENTANYL CITRATE 25 MCG: 50 INJECTION INTRAMUSCULAR; INTRAVENOUS at 10:53

## 2024-01-30 RX ADMIN — FENTANYL CITRATE 100 MCG: 50 INJECTION INTRAMUSCULAR; INTRAVENOUS at 07:56

## 2024-01-30 RX ADMIN — PROPOFOL 300 MG: 10 INJECTION, EMULSION INTRAVENOUS at 07:57

## 2024-01-30 RX ADMIN — SODIUM CHLORIDE, POTASSIUM CHLORIDE, SODIUM LACTATE AND CALCIUM CHLORIDE: 600; 310; 30; 20 INJECTION, SOLUTION INTRAVENOUS at 07:56

## 2024-01-30 RX ADMIN — BUPIVACAINE 10 ML: 13.3 INJECTION, SUSPENSION, LIPOSOMAL INFILTRATION at 07:24

## 2024-01-30 RX ADMIN — MIDAZOLAM 1 MG: 1 INJECTION INTRAMUSCULAR; INTRAVENOUS at 07:17

## 2024-01-30 RX ADMIN — ACETAMINOPHEN 1000 MG: 500 TABLET ORAL at 06:13

## 2024-01-30 RX ADMIN — ALBUTEROL SULFATE 2.5 MG: 2.5 SOLUTION RESPIRATORY (INHALATION) at 12:09

## 2024-01-30 RX ADMIN — IPRATROPIUM BROMIDE AND ALBUTEROL SULFATE 3 ML: .5; 3 SOLUTION RESPIRATORY (INHALATION) at 10:41

## 2024-01-30 RX ADMIN — SUGAMMADEX 200 MG: 100 INJECTION, SOLUTION INTRAVENOUS at 09:45

## 2024-01-30 ASSESSMENT — ACTIVITIES OF DAILY LIVING (ADL)
ADLS_ACUITY_SCORE: 29

## 2024-01-30 NOTE — ANESTHESIA POSTPROCEDURE EVALUATION
Patient: Ross Washington    Procedure: Procedure(s):  REPAIR, TENDON, BICEPS, DISTAL RIGHT       Anesthesia Type:  General    Note:     Postop Pain Control: Uneventful            Sign Out: Well controlled pain   PONV: No   Neuro/Psych: Uneventful            Sign Out: Acceptable/Baseline neuro status   Airway/Respiratory: Uneventful            Sign Out: Acceptable/Baseline resp. status   CV/Hemodynamics: Uneventful            Sign Out: Acceptable CV status; No obvious hypovolemia; No obvious fluid overload   Other NRE: NONE   DID A NON-ROUTINE EVENT OCCUR? No           Last vitals:  Vitals Value Taken Time   /87 01/30/24 1130   Temp     Pulse 80 01/30/24 1131   Resp 8 01/30/24 1131   SpO2 92 % 01/30/24 1131   Vitals shown include unfiled device data.    Electronically Signed By: Lion Portillo DO  January 30, 2024  12:03 PM

## 2024-01-30 NOTE — ANESTHESIA PROCEDURE NOTES
Brachial plexus Procedure Note    Pre-Procedure   Staff -        Anesthesiologist:  Anjum Deng MD       Resident/Fellow: Rosendo Carbajal MD       Performed By: resident       Location: pre-op       Pre-Anesthestic Checklist: patient identified, IV checked, site marked, risks and benefits discussed, informed consent, monitors and equipment checked, pre-op evaluation, at physician/surgeon's request and post-op pain management  Timeout:       Correct Patient: Yes        Correct Procedure: Yes        Correct Site: Yes        Correct Position: Yes        Correct Laterality: Yes        Site Marked: Yes  Procedure Documentation  Procedure: Brachial plexus       Laterality: right       Patient Position: sitting       Patient Prep/Sterile Barriers: sterile gloves, mask       Skin prep: Chloraprep       Local skin infiltrated with 3 mL of 1% lidocaine.  (interscalene approach).       Needle Type: short bevel       Needle Gauge: 21.        Needle Length (millimeters): 110        Ultrasound guided       1. Ultrasound was used to identify targeted nerve, plexus, vascular marker, or fascial plane and place a needle adjacent to it in real-time.       2. Ultrasound was used to visualize the spread of anesthetic in close proximity to the above referenced structure.       3. A permanent image is entered into the patient's record.    Assessment/Narrative         The placement was negative for: blood aspirated, painful injection and site bleeding       Paresthesias: No.       Bolus given via needle. no blood aspirated via catheter.        Secured via.        Insertion/Infusion Method: Single Shot       Complications: none    Medication(s) Administered   Bupivacaine 0.25% PF (Infiltration) - Infiltration   10 mL - 1/30/2024 7:24:00 AM  Bupivacaine liposome (Exparel) 1.3% LA inj susp (Infiltration) - Infiltration   10 mL - 1/30/2024 7:24:00 AM    FOR [facility] ([facility]) ONLY:   Pain Team Contact information:  "please page the Pain Team Via [facility]. Search \"Pain\". During daytime hours, please page the attending first. At night please page the resident first.      "

## 2024-01-30 NOTE — ANESTHESIA PROCEDURE NOTES
Airway       Patient location during procedure: OR       Procedure Start/Stop Times: 1/30/2024 8:01 AM  Staff -        Anesthesiologist:  Lion Portillo DO       Resident/Fellow: Danny Frazier MD       Performed By: resident  Consent for Airway        Urgency: elective  Indications and Patient Condition       Indications for airway management: mat-procedural       Induction type:intravenous       Mask difficulty assessment: 2 - vent by mask + OA or adjuvant +/- NMBA    Final Airway Details       Final airway type: endotracheal airway       Successful airway: ETT - single  Endotracheal Airway Details        ETT size (mm): 7.5       Cuffed: yes       Successful intubation technique: direct laryngoscopy       DL Blade Type: MAC 4       Grade View of Cords: 1       Position: Center       Measured from: gums/teeth    Post intubation assessment        Number of attempts at approach: 1       Number of other approaches attempted: 0       Secured with: pink tape       Ease of procedure: easy       Dentition: Intact and Unchanged    Medication(s) Administered   Medication Administration Time: 1/30/2024 8:01 AM

## 2024-01-30 NOTE — ANESTHESIA CARE TRANSFER NOTE
Patient: Ross Washington    Procedure: Procedure(s):  REPAIR, TENDON, BICEPS, DISTAL RIGHT       Diagnosis: Rupture of right distal biceps tendon, initial encounter [S46.211A]  Diagnosis Additional Information: No value filed.    Anesthesia Type:   General     Note:    Oropharynx: oropharynx clear of all foreign objects and spontaneously breathing  Level of Consciousness: awake  Oxygen Supplementation: face mask  Level of Supplemental Oxygen (L/min / FiO2): 6  Independent Airway: airway patency satisfactory and stable  Dentition: dentition unchanged  Vital Signs Stable: post-procedure vital signs reviewed and stable  Report to RN Given: handoff report given  Patient transferred to: PACU    Handoff Report: Identifed the Patient, Identified the Reponsible Provider, Reviewed the pertinent medical history, Discussed the surgical course, Reviewed Intra-OP anesthesia mangement and issues during anesthesia, Set expectations for post-procedure period and Allowed opportunity for questions and acknowledgement of understanding      Vitals:  Vitals Value Taken Time   BP     Temp     Pulse 83 01/30/24 1022   Resp 23 01/30/24 1022   SpO2 94 % 01/30/24 1022   Vitals shown include unfiled device data.    Electronically Signed By: Danny Frazier MD  January 30, 2024  10:22 AM

## 2024-01-30 NOTE — ANESTHESIA PROCEDURE NOTES
Airway       Patient location during procedure: OR       Procedure Start/Stop Times: 1/30/2024 8:01 AM  Staff -        Anesthesiologist:  Lion Portillo DO       Resident/Fellow: Danny Frazier MD       Performed By: resident  Consent for Airway        Urgency: elective  Indications and Patient Condition       Indications for airway management: mat-procedural         Mask difficulty assessment: 2 - vent by mask + OA or adjuvant +/- NMBA    Final Airway Details       Final airway type: endotracheal airway       Successful airway: ETT - single  Endotracheal Airway Details        ETT size (mm): 7.5       Cuffed: yes       Successful intubation technique: video laryngoscopy       VL Blade Size: Glidescope 4       Grade View of Cords: 1       Adjucts: stylet       Position: Center       Measured from: lips       Secured at (cm): 26       Bite block used: None    Post intubation assessment        Number of attempts at approach: 1       Number of other approaches attempted: 0       Secured with: pink tape       Ease of procedure: easy       Dentition: Intact and Unchanged    Medication(s) Administered   Medication Administration Time: 1/30/2024 8:01 AM

## 2024-01-30 NOTE — DISCHARGE INSTRUCTIONS
DISTAL BICEPS REPAIR    POST OP INSTRUCTIONS  Fouzia Izaguirre MD  [phone]   of Orthopedic Surgery  [facility], [facility]      ABOUT YOUR SURGERY  With this surgery, we took your torn biceps tendon and repaired it to its attachment site of a bone (radius) in your forearm. With time, the tendon will heal back to the bone and function to again flex and supinate your forearm.    POST OP OFFICE VISIT  You should have an office visit scheduled with Dr. Izaguirre or her Physician Assistant, Adarsh Devine, within 7-10 days after your surgery. If you do not have this appointment please call [phone] to set up an appointment.      MEDICATIONS  You will receive prescriptions for pain medication (and other applicable medications) on the day of your surgery if you have not already had it arranged to  from the office or sent to your pharmacy ahead of time.   The goal of the multimodal pain regimen is to minimize the amount of narcotic (opioid) medications that you require for postoperative pain control - if possible, try and take the non-narcotic pain medications instead of the narcotic medication unless you feel you need it. You should discuss these medications with your primary care doctor to make sure there are no interactions with any other medications that you are taking, and that your kidneys and/or liver are healthy enough for their use.  Unless otherwise specified, you will be prescribed Hydrocodone-Acetaminophen 5-325 mg (Norco; #20 total) for pain. You may take 1 or 2 tablets every 4-6 hours as needed.   You will also be prescribed Celecoxib 100 mg twice daily (Celebrex; #30 total) for pain as needed. This should be your option for pain control if the pain is not so bad as to require the narcotic medication(s).  You will also be prescribed Gabapentin 100 mg every 8 hours (Neurontin; #21 total) to be taken as scheduled. This medication affects chemicals and nerves  in the body and is another component of the multi-modal pain regimen. Use of this medication will hopefully reduce comsumption of the narcotic pain medications. Its use can contribute to drowsiness, sleepiness, or dizziness in some patients.    Once you are off the Norco and Celebrex, or immediately after surgery, you may start taking Tylenol and Motrin. Unless you have a medical condition that prevents the use of Tylenol or Motrin, you can take 400 mg of Motrin and 650 mg of Tylenol TOGETHER or in a staggered fashion every 6 hours as needed. Do NOT take Norco and Tylenol together (as Norco contains tylenol), and do not take Celebrex and Motrin together.  You will also receive a prescription for a nausea medicine called Ondansetron 4 mg ODT (Zofran; #10 total). You may take one tablet every 8 hours as needed for nausea.  You are strongly encouraged to take a stool softener and/or laxative while taking narcotic pain medicine. You will be prescribed Colace 100 mg (#30 total), a stool softener, which can be taken twice daily for constipation as needed.  Finally, you will receive a prescription for Prilosec 20 mg (Nexium; #14 total) which we recommend to take every morning in order to combat the occurrence of heartburn while on the medications above in the postoperative period.  If you are having pain beyond what is expected from surgery and you need to speak to someone from our office, please do not wait until after 4 PM on weekdays or over the weekend, as we will not be able to address it.  It is hospital policy that we do not refill narcotic pain medicine after hours or on weekends.  Please call [phone] for further instructions.    ANTICOAGULATION (BLOOD THINNERS)  You will not receive any medication for anticoagulation after this surgery.  Ambulation, foot/hand pumps, and movement of the surgical site within the confines of the weightbearing and motion restrictions below are additional ways to reduce the  occurrence of blood clots after surgery.    DRESSING CHANGES, WOUND CARE, AND BATHING  Following surgery you will be placed in a splint. This is a rigid dressing that is designed to keep your elbow from moving after surgery. This splint must be kept clean and dry at all times. To shower, we recommend you cover the splint with a plastic bag and use tape to secure the top so that no water contacts the splint. If it does get wet, please contact our office.  You must not remove the splint, it will be removed at your first post op visit.   When the splint is removed you will also have your sutures removed. Following this you no longer need a dressing on the wound. It is then ok to start showering normally, but do not submerge the wound.  Please do not apply any lotions, creams, or ointments directly to the incisions until 30 days after surgery.   AVOID STEAM ROOMS, SWIMMING POOLS, AND TUBS FOR A FULL 4 WEEKS AFTER THE DATE OF YOUR SURGERY TO AVOID INFECTION.    SWELLING / INFLAMMATION CONTROL  Icing the surgical is very important following surgery.   If you choose to use regular ice packs, please limit icing to 20 minute sessions every 2-3 hours at most to avoid any skin problems.   Icing should be continued for the first several weeks following surgery.   In addition to icing, compression with a support/wrap and elevation of the affected limb above the level of your heart will promote good circulation and reduce both swelling and pain.  It is normal to have swelling and/or bruising around your incisions, or about the surgical extremity after surgery. This will gradually resolve after surgery.  PROLONGED FEVER OVER 102 DEGREES, THICK DRAINAGE, CHEST PAIN, SHORTNESS OF BREATH, OR CALF PAIN SHOULD BE REPORTED IMMEDIATELY.  PLEASE CALL US [phone] IF YOU EXPERIENCE THESE SYMPTOMS.     WEIGHTBEARING STATUS / IMMOBILIZATION  You should not bear any weight with your surgical extremity while it is in the brace/splint,  and until you are instructed as part of the postoperative rehabilitation protocol.    REHABILITATION / PHYSICAL THERAPY  Once the splint has been removed at your first post op visit, a hinged elbow brace will be applied. The purpose of this brace is to allow a safe range of motion while also preventing excessive stiffness.  The brace will only allow a set amount of motion. Typically, after the first 2 weeks, we will allow you to increase that motion slowly. Specific instructions will be provided at your first post op visit.  The brace should be worn morning and night, for 4-6 weeks, but may be removed for bathing.   You will start physical therapy after your first postoperative visit.  Please avoid any lifting, pulling, or sports activity with the operative arm until cleared by Dr. Izaguirre.    DRIVING AFTER SURGERY  The ability for someone to resume driving after surgery is seldom a medical question, but more often a legal question.  Driving with any form of a brace on may be interpreted as driving while impaired.  It is the responsibility of all licensed drivers to drive safely at all times no matter what their permanent or temporary impairment may be.      WORK AFTER SURGERY  Discussions of return to work will depend on the type of work that you perform, and the immediacy of needs to return. This discussion will be had preoperatively and at each visit postoperatively in order to help you, the patient, to get back to what you need to be doing in a timely fashion, while not compromising your surgical outcome.     DIET AFTER SURGERY  A balanced, healthy diet high in proteins is most valuable for the body to utilize during the healing process after surgery. There are otherwise no formal restrictions on your diet after surgery.     OTHER COMMENTS  We recommend to abstain from alcohol or tobacco use in the postoperative period. This is for general health reasons, as combination with the prescribed medications can be  dangerous, but also to prevent adversely affecting the body's healing response to your surgery.      ***If you have questions and need to speak to the nurse, please send us a message via [employer], or contact us at our triage center at [phone]  ***If you have an emergency after hours or on the weekend call the physician on call at [phone] or [phone]     Same-Day Surgery   Adult Discharge Orders & Instructions     For 24 hours after surgery:  Get plenty of rest.  A responsible adult must stay with you for at least 24 hours after you leave the hospital.   Pain medication can slow your reflexes. Do not drive or use heavy equipment.  If you have weakness or tingling, don't drive or use heavy equipment until this feeling goes away.  Mixing alcohol and pain medication can cause dizziness and slow your breathing. It can even be fatal. Do not drink alcohol while taking pain medication.  Avoid strenuous or risky activities.  Ask for help when climbing stairs.   You may feel lightheaded.  If so, sit for a few minutes before standing.  Have someone help you get up.   If you have nausea (feel sick to your stomach), drink only clear liquids such as apple juice, ginger ale, broth or 7-Up.  Rest may also help.  Be sure to drink enough fluids.  Move to a regular diet as you feel able. Take pain medications with a small amount of solid food, such as toast or crackers, to avoid nausea.   A slight fever is normal. Call the doctor if your fever is over 100 F (37.7 C) (taken under the tongue) or lasts longer than 24 hours.  You may have a dry mouth, muscle aches, trouble sleeping or a sore throat.  These symptoms should go away after 24 hours.  Do not make important or legal decisions.   Pain Management:      1. Take pain medication (if prescribed) for pain as directed by your physician.        2. WARNING: If the pain medication you have been prescribed contains Tylenol  (acetaminophen), DO NOT take additional doses of Tylenol  (acetaminophen).     Call your doctor for any of the followin.  Signs of infection (fever, growing tenderness at the surgery site, severe pain, a large amount of drainage or bleeding, foul-smelling drainage, redness, swelling).    2.  It has been over 8 to 10 hours since surgery and you are still not able to urinate (pee).    3.  Headache for over 24 hours.    4.  Numbness, tingling or weakness the day after surgery (if you had spinal anesthesia).  To contact a doctor, call Dr. Izaguirre  [phone] or:  ' [phone] and ask for the Resident On Call for:          Ortho Resident On-call (answered 24 hours a day)  '   Emergency Department:  [place] [facility]: [phone]  [place] [facility]: [phone]               Rev. 10/2014

## 2024-01-30 NOTE — OP NOTE
PATIENT NAME: Ross Washington  1985  [id number]     DATE OF ENCOUNTER:  1/30/24      PREOPERATIVE DIAGNOSIS:  Right high grade partial distal biceps rupture.        POSTOPERATIVE DIAGNOSIS:  Right high grade partial distal biceps rupture.        PROCEDURE:  Right distal biceps repair.        STAFF SURGEON:  Fouzia Izaguirre MD      ASSISTANTS: Sujatha Tanner PA-C; Warner Nicole MD    The assistance of Sujatha Tanner was necessitated for retraction, positioning, suture management. No suitably qualified resident or fellow was available to assist.       ANESTHESIA:  General endotracheal anesthesia with supplemental regional block.        ESTIMATED BLOOD LOSS:  10 mL.        COMPLICATIONS:  None.        IMPLANTS:  Arthrex Fibertak button and pushlok anchor.        BRIEF PATIENT HISTORY: Ross Washington s a 38 -year-old who sustained a work related injury on 12/11/23.  Patient had an MRI which demonstrated a partial high grade distal biceps tear  Given the patient's active lifestyle, he felt that this would be extremely symptomatic and wished to proceed with surgery.  We did discuss the risks and benefits of both nonoperative and operative treatment.  We discussed the expected postoperative restrictions and rehabilitation course. Specific risks discussed include risk of tendon rerupture, neurovascular injury, posterior interosseous nerve injury, lateral antebrachial cutaneous nerve injury, infection, stiffness, pain, hardware related complications, fracture, need for future surgery. The patient had the opportunity for questions to be answered and wished to proceed to surgery.        DESCRIPTION OF PROCEDURE:  The patient was identified in the preoperative area and the correct right elbow was marked for surgery.  A regional block was performed by the anesthesia team for postoperative pain control. The patient was taken to the operating room where  general endotracheal anesthesia was induced by the anesthesia team  and was moved to the operating room table in the supine position with the right arm on a radiolucent arm board.  The right upper extremity was prepped and draped in the usual sterile fashion.  A timeout was held in accordance with hospital policy, confirming correct patient, side, site, procedure and administration of IV antibiotics prior to incision.  The tourniquet was inflated. I completed a transverse incision approximately 4 cm distal to the elbow crease with a curve proximal medial due to patient habitus and need for additional soft tissue visualization.  I identified and protected and lateral antebrachial cutaneous nerve. I dissected down to the fascia released it as there was no rent given the partial tear. I was able to identify the proximal biceps tendon and follow this distally. Crossing vessels were ligated and vessel clips placed. The tendon was identified on the radial tuberosity, the long head was torn and the short head was partial torn, it was very diseased and frayed. The tear was completed.  I debrided the very distal end of the tendon which appeared very thickened and frayed. The proximal tendon was also quite thickened.   I used a grasping FiberLink stitch x2 to prepare the distal biceps tendon.  I then exposed to the radial tuberosity.  Care was taken to keep the arm in supination to protect the posterior interosseous nerve.  I drilled for a 2.6mm fibertak button x2.  I irrigated the wound and then I loaded the biceps button on the suture from the biceps tendon.  The fibertape sutures were placed through the appropriate shutting sutures of the distal button first. I attempted to repeat this with the more proximal button however it pulled out and was not usable, there were no additional buttons available in the hospital to use. I therefore made the decision to use a Pushlok anchor in the same hole. The sutures were passed through the anchor, the arm was then flexed to 90, the sutures tensioned,  and anchor inserted. The sutures of the distal button were then tensioned.   One limb of suture was passed back through the tendon and tied down.  There was excellent fixation on the tendon at this site.  The tourniquet was let down and hemostasis achieved. The wound was copiously irrigated and closed in layered fashion.  Steri-Strips and soft dressing were applied.  The arm was placed into a long arm splint.  The patient was extubated and transported to recovery in stable condition.  There were no apparent intraoperative complications.  The sponge and needle count were correct at the end of the case. I discussed the findings with the patient's family in the waiting room.           Fouzia Izaguirre MD

## 2024-02-03 ENCOUNTER — HEALTH MAINTENANCE LETTER (OUTPATIENT)
Age: 39
End: 2024-02-03

## 2024-02-08 ENCOUNTER — THERAPY VISIT (OUTPATIENT)
Dept: OCCUPATIONAL THERAPY | Facility: CLINIC | Age: 39
End: 2024-02-08
Attending: ORTHOPAEDIC SURGERY
Payer: OTHER MISCELLANEOUS

## 2024-02-08 ENCOUNTER — OFFICE VISIT (OUTPATIENT)
Dept: ORTHOPEDICS | Facility: CLINIC | Age: 39
End: 2024-02-08
Payer: OTHER MISCELLANEOUS

## 2024-02-08 DIAGNOSIS — S59.901A ELBOW INJURY, RIGHT, INITIAL ENCOUNTER: ICD-10-CM

## 2024-02-08 DIAGNOSIS — Z47.89 ORTHOPEDIC AFTERCARE: Primary | ICD-10-CM

## 2024-02-08 DIAGNOSIS — S46.211A RUPTURE OF RIGHT DISTAL BICEPS TENDON, INITIAL ENCOUNTER: ICD-10-CM

## 2024-02-08 DIAGNOSIS — M25.621 STIFFNESS OF ELBOW JOINT, RIGHT: Primary | ICD-10-CM

## 2024-02-08 PROCEDURE — 97110 THERAPEUTIC EXERCISES: CPT | Mod: GO | Performed by: OCCUPATIONAL THERAPIST

## 2024-02-08 PROCEDURE — 97165 OT EVAL LOW COMPLEX 30 MIN: CPT | Mod: GO | Performed by: OCCUPATIONAL THERAPIST

## 2024-02-08 PROCEDURE — 97535 SELF CARE MNGMENT TRAINING: CPT | Mod: GO | Performed by: OCCUPATIONAL THERAPIST

## 2024-02-08 PROCEDURE — 99024 POSTOP FOLLOW-UP VISIT: CPT | Performed by: ORTHOPAEDIC SURGERY

## 2024-02-08 NOTE — LETTER
2/8/2024         RE: Ross Washington  [address]  [place] [address]        Dear Colleague,    Thank you for referring your patient, Ross Washington, to the [facility]. Please see a copy of my visit note below.    CHIEF COMPLAINT: Status post Right distal biceps repair     DATE OF SURGERY: 1/30/2024     HISTORY OF PRESENT ILLNESS: Ross is an extremely pleasant 38 year-old male who is 1 week status post the above procedure.  Doing okay, minimal pain SANE R - 0 L - 100    EXAM:  Pleasant adult 38 year old in no distress.  Respirations even and unlabored.  Right upper extremity: Incisions clean, dry, and intact. No erythema. No drainage. Sens intact Ax/Musc/Med/Rad/Uln nerves. Motor intact EPL, FPL, and Intrinsics.  Elbow range of motion not tested due to postop restrictions.    ASSESSMENT:  1.  1 week status post above procedure    PLAN:  I reviewed the intraop findings.  Patient is being fit for the hinged elbow brace today, a sling was placed today.  We discussed use of the brace at all times except while taking a shower, I recommended a sling in the shower if difficulty remembering.  Steri-Strips were placed over the incision today, patient was instructed not to soak in any hot tubs or pools, no applying any creams or lotions.  Patient is going to start occupational therapy on the distal biceps repair protocol.  Will see the patient back in 6 weeks time.      Fouzia Chisholm  Orthopedic Surgery Sports Medicine and Shoulder Surgery      Again, thank you for allowing me to participate in the care of your patient.        Sincerely,        FOUZIA CHISHOLM MD

## 2024-02-08 NOTE — PROGRESS NOTES
CHIEF COMPLAINT: Status post Right distal biceps repair     DATE OF SURGERY: 1/30/2024     HISTORY OF PRESENT ILLNESS: Ross is an extremely pleasant 38 year-old male who is 1 week status post the above procedure.  Doing okay, minimal pain SANE R - 0 L - 100    EXAM:  Pleasant adult 38 year old in no distress.  Respirations even and unlabored.  Right upper extremity: Incisions clean, dry, and intact. No erythema. No drainage. Sens intact Ax/Musc/Med/Rad/Uln nerves. Motor intact EPL, FPL, and Intrinsics.  Elbow range of motion not tested due to postop restrictions.    ASSESSMENT:  1.  1 week status post above procedure    PLAN:  I reviewed the intraop findings.  Patient is being fit for the hinged elbow brace today, a sling was placed today.  We discussed use of the brace at all times except while taking a shower, I recommended a sling in the shower if difficulty remembering.  Steri-Strips were placed over the incision today, patient was instructed not to soak in any hot tubs or pools, no applying any creams or lotions.  Patient is going to start occupational therapy on the distal biceps repair protocol.  Will see the patient back in 6 weeks time.      Fouzia Izaguirre  Orthopedic Surgery Sports Medicine and Shoulder Surgery

## 2024-02-08 NOTE — PATIENT INSTRUCTIONS
Thanks for coming today.  Or[facility] Medicine [facility]  [address] [place] [address]    To schedule future appointments in Ortho Clinic, you may call [phone].    To schedule ordered imaging or an injection ordered by your provider:  Call Central Imaging Injection scheduling line: [phone]    MyChart available online at:  [email or web address]    Please call if any further questions or concerns ([phone]).  Clinic hours 8 am to 5 pm.    Return to clinic (call) if symptoms worsen or fail to improve.

## 2024-02-08 NOTE — PROGRESS NOTES
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, family, etc)    Employment: Yes [employer]  Hobbies/Interests: video [employer]    Patient goals for therapy: use my right arm    Pain assessment: Pain present  See objective evaluation for additional pain details     Objective   ADDITIONAL HISTORY:  Right hand dominant  Patient reports symptoms of pain, stiffness/loss of motion, weakness/loss of strength, and edema of the right elbow   Transportation: drives  Currently working in alternate job with no use of right arm    Functional Outcome Measure:   Functional Outcome Measure:  Upper Extremity Functional Index  SCORE:   Column Totals: 12/80  (A lower score indicates greater disability.)    PAIN:  Pain Level at Rest: 0/10  Pain Level with Use: 7/10  Pain Location: elbow  Pain Quality: Burning and Sharp  Pain Frequency: intermittent  Pain is Worst: daytime  Pain is Exacerbated By: when post op splint was removed, this resolved after getting hinged brace  Pain is Relieved By: rest and brace  Pain Progression: Improved    EDEMA: Mild     SCAR/WOUND:  well healed, no drainage, steri-strips intact    SENSATION: WNL throughout all nerve distributions; per patient report      ROM:   Note: extension block -60 in hinged brace,  avoided ext beyond block with ROM measurements  Elbow AROM (PROM)  Right   Extension -60   Flexion (130)   Supination (50)   Pronation (70)      Wrist AROM (PROM)  Right   Extension 50   Flexion 50     STRENGTH: Contraindicated     Assessment & Plan   CLINICAL IMPRESSIONS  Medical Diagnosis: R distal bicep rupture, s/p repair    Treatment Diagnosis: R elbow stiffness    Impression/Assessment: Pt is a 38 year old male presenting to Occupational Therapy due to right elbow stiffness s/p distal bicep rupture and repair.  Patient's limitations or Problem List includes: Pain, Decreased ROM/motion, Increased edema, Weakness, and Tightness in musculature of the right elbow which interferes with the patient's ability to perform Self Care Tasks (dressing, eating, bathing, hygiene/toileting), Work Tasks, Sleep Patterns, Recreational Activities, Household Chores, and Driving  as compared to previous level of function.    Clinical Decision Making (Complexity):  Assessment of Occupational Performance: 5 or more Performance Deficits  Occupational Performance Limitations: bathing/showering, dressing, hygiene and grooming, driving and community mobility, home establishment and management, meal preparation and cleanup, sleep, work, and leisure activities  Clinical Decision Making (Complexity): Low complexity    PLAN OF CARE  Treatment Interventions:  Modalities:  US  Therapeutic Exercise:  AROM, AAROM, PROM, Isotonics, and Isometrics  Neuromuscular re-education:  Nerve Gliding, Desensitization, Posture, and Kinesiotaping  Manual Techniques:  Scar mobilization and Myofascial release  Orthotic Fabrication:  Dynamic Hinged Long arm Brace  Self Care:  Self Care Tasks, Ergonomic Considerations, and Diagnostic Education    Long Term Goals   OT Goal 1  Goal Identifier: lifting/carrying  Goal Description: Pt will increase strength to be able to lift and carry without difficulty  Rationale: In order to maximize safety and independence with ADL/IADLs  Goal Progress: unable to use R arm due to tendon repair/precautions  Target Date: 05/07/24      Frequency of Treatment: 1 x per  week  Duration of Treatment: 12 weeks     Recommended Referrals to Other Professionals:  NA  Education Assessment: Learner/Method: Patient;Demonstration;Pictures/Video     Risks and benefits of evaluation/treatment have been explained.   Patient/Family/caregiver agrees with Plan of Care.     Evaluation Time:    CATHLEEN Menezes Low Complexity Minutes (62835): 15   Present: Not applicable     Signing Clinician: Mylene Rios OT

## 2024-02-12 ENCOUNTER — THERAPY VISIT (OUTPATIENT)
Dept: OCCUPATIONAL THERAPY | Facility: CLINIC | Age: 39
End: 2024-02-12
Payer: OTHER MISCELLANEOUS

## 2024-02-12 DIAGNOSIS — S46.211A RUPTURE OF RIGHT DISTAL BICEPS TENDON, INITIAL ENCOUNTER: ICD-10-CM

## 2024-02-12 DIAGNOSIS — S59.901A ELBOW INJURY, RIGHT, INITIAL ENCOUNTER: Primary | ICD-10-CM

## 2024-02-12 DIAGNOSIS — M25.621 STIFFNESS OF ELBOW JOINT, RIGHT: ICD-10-CM

## 2024-02-12 PROCEDURE — 97110 THERAPEUTIC EXERCISES: CPT | Mod: GO | Performed by: OCCUPATIONAL THERAPIST

## 2024-02-19 ENCOUNTER — THERAPY VISIT (OUTPATIENT)
Dept: OCCUPATIONAL THERAPY | Facility: CLINIC | Age: 39
End: 2024-02-19
Payer: OTHER MISCELLANEOUS

## 2024-02-19 DIAGNOSIS — S46.211A RUPTURE OF RIGHT DISTAL BICEPS TENDON, INITIAL ENCOUNTER: ICD-10-CM

## 2024-02-19 DIAGNOSIS — S59.901A ELBOW INJURY, RIGHT, INITIAL ENCOUNTER: Primary | ICD-10-CM

## 2024-02-19 DIAGNOSIS — M25.621 STIFFNESS OF ELBOW JOINT, RIGHT: ICD-10-CM

## 2024-02-19 PROCEDURE — 97110 THERAPEUTIC EXERCISES: CPT | Mod: GO

## 2024-02-26 ENCOUNTER — THERAPY VISIT (OUTPATIENT)
Dept: OCCUPATIONAL THERAPY | Facility: CLINIC | Age: 39
End: 2024-02-26
Payer: OTHER MISCELLANEOUS

## 2024-02-26 DIAGNOSIS — S59.901A ELBOW INJURY, RIGHT, INITIAL ENCOUNTER: Primary | ICD-10-CM

## 2024-02-26 DIAGNOSIS — M25.621 STIFFNESS OF ELBOW JOINT, RIGHT: ICD-10-CM

## 2024-02-26 DIAGNOSIS — S46.211A RUPTURE OF RIGHT DISTAL BICEPS TENDON, INITIAL ENCOUNTER: ICD-10-CM

## 2024-02-26 PROCEDURE — 97110 THERAPEUTIC EXERCISES: CPT | Mod: GO | Performed by: OCCUPATIONAL THERAPIST

## 2024-02-26 PROCEDURE — 97140 MANUAL THERAPY 1/> REGIONS: CPT | Mod: GO | Performed by: OCCUPATIONAL THERAPIST

## 2024-03-06 ENCOUNTER — THERAPY VISIT (OUTPATIENT)
Dept: OCCUPATIONAL THERAPY | Facility: CLINIC | Age: 39
End: 2024-03-06
Payer: OTHER MISCELLANEOUS

## 2024-03-06 DIAGNOSIS — S59.901A ELBOW INJURY, RIGHT, INITIAL ENCOUNTER: Primary | ICD-10-CM

## 2024-03-06 DIAGNOSIS — M25.621 STIFFNESS OF ELBOW JOINT, RIGHT: ICD-10-CM

## 2024-03-06 DIAGNOSIS — S46.211A RUPTURE OF RIGHT DISTAL BICEPS TENDON, INITIAL ENCOUNTER: ICD-10-CM

## 2024-03-06 PROCEDURE — 97140 MANUAL THERAPY 1/> REGIONS: CPT | Mod: GO | Performed by: OCCUPATIONAL THERAPIST

## 2024-03-06 PROCEDURE — 97110 THERAPEUTIC EXERCISES: CPT | Mod: GO | Performed by: OCCUPATIONAL THERAPIST

## 2024-03-13 ENCOUNTER — THERAPY VISIT (OUTPATIENT)
Dept: OCCUPATIONAL THERAPY | Facility: CLINIC | Age: 39
End: 2024-03-13
Payer: OTHER MISCELLANEOUS

## 2024-03-13 DIAGNOSIS — S46.211A RUPTURE OF RIGHT DISTAL BICEPS TENDON, INITIAL ENCOUNTER: ICD-10-CM

## 2024-03-13 DIAGNOSIS — M25.621 STIFFNESS OF ELBOW JOINT, RIGHT: ICD-10-CM

## 2024-03-13 DIAGNOSIS — S59.901A ELBOW INJURY, RIGHT, INITIAL ENCOUNTER: Primary | ICD-10-CM

## 2024-03-13 PROCEDURE — 97110 THERAPEUTIC EXERCISES: CPT | Mod: GO | Performed by: OCCUPATIONAL THERAPIST

## 2024-03-13 PROCEDURE — 97140 MANUAL THERAPY 1/> REGIONS: CPT | Mod: GO | Performed by: OCCUPATIONAL THERAPIST

## 2024-03-20 ENCOUNTER — THERAPY VISIT (OUTPATIENT)
Dept: OCCUPATIONAL THERAPY | Facility: CLINIC | Age: 39
End: 2024-03-20
Payer: OTHER MISCELLANEOUS

## 2024-03-20 DIAGNOSIS — S46.211A RUPTURE OF RIGHT DISTAL BICEPS TENDON, INITIAL ENCOUNTER: ICD-10-CM

## 2024-03-20 DIAGNOSIS — M25.621 STIFFNESS OF ELBOW JOINT, RIGHT: ICD-10-CM

## 2024-03-20 DIAGNOSIS — S59.901A ELBOW INJURY, RIGHT, INITIAL ENCOUNTER: Primary | ICD-10-CM

## 2024-03-20 PROCEDURE — 97140 MANUAL THERAPY 1/> REGIONS: CPT | Mod: GO | Performed by: OCCUPATIONAL THERAPIST

## 2024-03-20 PROCEDURE — 97110 THERAPEUTIC EXERCISES: CPT | Mod: GO | Performed by: OCCUPATIONAL THERAPIST

## 2024-03-21 NOTE — PROGRESS NOTES
CHIEF COMPLAINT: Status post Right distal biceps repair     DATE OF SURGERY: 1/30/2024     HISTORY OF PRESENT ILLNESS: Ross is an extremely pleasant 38 year-old male who is 8 week status post the above procedure. Patient is doing therapy and doing well. He is still noticing some numbness around forearm. SANE R - 30-40 L - 100    EXAM:  Pleasant adult 38 year old in no distress.  Respirations even and unlabored.  Right upper extremity: Incision healed. No erythema. No drainage. Sens intact Ax/Musc/Med/Rad/Uln nerves. Motor intact EPL, FPL, and Intrinsics.   Full passive range of motion about the elbow from full extension to able to touch the shoulder to 140 degrees, full passive pronation and supination.  Palpable biceps tendon distally      ASSESSMENT:  1.  8 week status post above procedure    PLAN:  I nena with the patient that things are going well today.  We discussed okay to wean out of the brace.  We discussed no strengthening until the 12 week luis.  Patient was given updated work restrictions today.  He is can continue with occupational therapy.  I will follow-up in 2 to 3 months time.    Fouzia Izaguirre  Orthopedic Surgery Sports Medicine and Shoulder Surgery

## 2024-03-27 ENCOUNTER — THERAPY VISIT (OUTPATIENT)
Dept: OCCUPATIONAL THERAPY | Facility: CLINIC | Age: 39
End: 2024-03-27
Payer: OTHER MISCELLANEOUS

## 2024-03-27 DIAGNOSIS — S59.901A ELBOW INJURY, RIGHT, INITIAL ENCOUNTER: Primary | ICD-10-CM

## 2024-03-27 DIAGNOSIS — S46.211A RUPTURE OF RIGHT DISTAL BICEPS TENDON, INITIAL ENCOUNTER: ICD-10-CM

## 2024-03-27 DIAGNOSIS — M25.621 STIFFNESS OF ELBOW JOINT, RIGHT: ICD-10-CM

## 2024-03-27 PROCEDURE — 97140 MANUAL THERAPY 1/> REGIONS: CPT | Mod: GO | Performed by: OCCUPATIONAL THERAPIST

## 2024-03-27 PROCEDURE — 97110 THERAPEUTIC EXERCISES: CPT | Mod: GO | Performed by: OCCUPATIONAL THERAPIST

## 2024-03-28 ENCOUNTER — OFFICE VISIT (OUTPATIENT)
Dept: ORTHOPEDICS | Facility: CLINIC | Age: 39
End: 2024-03-28
Payer: OTHER MISCELLANEOUS

## 2024-03-28 DIAGNOSIS — Z47.89 ORTHOPEDIC AFTERCARE: Primary | ICD-10-CM

## 2024-03-28 PROCEDURE — 99024 POSTOP FOLLOW-UP VISIT: CPT | Performed by: ORTHOPAEDIC SURGERY

## 2024-03-28 NOTE — LETTER
March 28, 2024      Ross Washington  [address]  [place] [address]        To whom it may concern:     Ross Washington is under my professional care for his right arm. He had surgery on 1/30/24. 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  He has restrictions of a 1 pound weight limit along with no push, pull, or carry with right arm until follow up in 2-3 months.       Sincerely,        Fouzia Izaguirre MD

## 2024-03-28 NOTE — PATIENT INSTRUCTIONS
Thanks for coming today.  Ortho[facility] Medicine Clinic  [address] [place] [address]    To schedule future appointments in Ortho Clinic, you may call [phone].    To schedule ordered imaging or an injection ordered by your provider:  Call Central Imaging Injection scheduling line: [phone]    MyChart available online at:  [email or web address]    Please call if any further questions or concerns ([phone]).  Clinic hours 8 am to 5 pm.    Return to clinic (call) if symptoms worsen or fail to improve.

## 2024-03-28 NOTE — LETTER
3/28/2024         RE: Ross Washington  [address]  [place] [address]        Dear Colleague,    Thank you for referring your patient, Ross Washington, to the [facility]. Please see a copy of my visit note below.    CHIEF COMPLAINT: Status post Right distal biceps repair     DATE OF SURGERY: 1/30/2024     HISTORY OF PRESENT ILLNESS: Ross is an extremely pleasant 38 year-old male who is 8 week status post the above procedure. Patient is doing therapy and doing well. He is still noticing some numbness around forearm. SANE R - 30-40 L - 100    EXAM:  Pleasant adult 38 year old in no distress.  Respirations even and unlabored.  Right upper extremity: Incision healed. No erythema. No drainage. Sens intact Ax/Musc/Med/Rad/Uln nerves. Motor intact EPL, FPL, and Intrinsics.   Full passive range of motion about the elbow from full extension to able to touch the shoulder to 140 degrees, full passive pronation and supination.  Palpable biceps tendon distally      ASSESSMENT:  1.  8 week status post above procedure    PLAN:  I nena with the patient that things are going well today.  We discussed okay to wean out of the brace.  We discussed no strengthening until the 12 week luis.  Patient was given updated work restrictions today.  He is can continue with occupational therapy.  I will follow-up in 2 to 3 months time.    Fouzia Chisholm  Orthopedic Surgery Sports Medicine and Shoulder Surgery      Again, thank you for allowing me to participate in the care of your patient.        Sincerely,        FOUZIA CHISHOLM MD

## 2024-04-10 ENCOUNTER — THERAPY VISIT (OUTPATIENT)
Dept: OCCUPATIONAL THERAPY | Facility: CLINIC | Age: 39
End: 2024-04-10
Payer: OTHER MISCELLANEOUS

## 2024-04-10 DIAGNOSIS — S59.901A ELBOW INJURY, RIGHT, INITIAL ENCOUNTER: Primary | ICD-10-CM

## 2024-04-10 DIAGNOSIS — M25.621 STIFFNESS OF ELBOW JOINT, RIGHT: ICD-10-CM

## 2024-04-10 DIAGNOSIS — S46.211A RUPTURE OF RIGHT DISTAL BICEPS TENDON, INITIAL ENCOUNTER: ICD-10-CM

## 2024-04-10 PROCEDURE — 97140 MANUAL THERAPY 1/> REGIONS: CPT | Mod: GO | Performed by: OCCUPATIONAL THERAPIST

## 2024-04-10 PROCEDURE — 97110 THERAPEUTIC EXERCISES: CPT | Mod: GO | Performed by: OCCUPATIONAL THERAPIST

## 2024-05-01 ENCOUNTER — THERAPY VISIT (OUTPATIENT)
Dept: OCCUPATIONAL THERAPY | Facility: CLINIC | Age: 39
End: 2024-05-01
Payer: OTHER MISCELLANEOUS

## 2024-05-01 DIAGNOSIS — S59.901A ELBOW INJURY, RIGHT, INITIAL ENCOUNTER: Primary | ICD-10-CM

## 2024-05-01 DIAGNOSIS — S46.211A RUPTURE OF RIGHT DISTAL BICEPS TENDON, INITIAL ENCOUNTER: ICD-10-CM

## 2024-05-01 DIAGNOSIS — M25.621 STIFFNESS OF ELBOW JOINT, RIGHT: ICD-10-CM

## 2024-05-01 PROCEDURE — 97110 THERAPEUTIC EXERCISES: CPT | Mod: GO | Performed by: OCCUPATIONAL THERAPIST

## 2024-05-01 NOTE — PROGRESS NOTES
05/01/24 0500   Appointment Info   Treating Provider Mylene Rios, OTR/L, CHT   Total/Authorized Visits 12   Visits Used 10   Medical Diagnosis R distal bicep rupture, s/p repair   OT Tx Diagnosis R elbow stiffness   Precautions/Limitations Avoid heavy resistance to biceps, no > 5 lbs   Other pertinent information 13w 1d post   Progress Note/Certification   Onset of Illness/Injury or Date of Surgery 01/30/24   Therapy Frequency 1 x per week   Predicted Duration 12 weeks   Progress Note Completed Date 05/01/24   OT Goal 1   Goal Identifier lifting/carrying   Goal Description Pt will increase strength to be able to lift and carry without difficulty   Rationale In order to maximize safety and independence with ADL/IADLs   Goal Progress can progress to light lifting, anticipate full return after MD f/u 5/3/24   Target Date 05/07/24   Subjective Report   Subjective Report The arm feels fine but the forearm is still numb, the edge has just a little more feeling.   Objective Measures   Objective Measures Objective Measure 1;Hand Obj Measures   Hand Objective Measures Strength   Strength    Objective Measure 1   Objective Measure pain 0-10   Details reports no pain   Elbow ROM    Extension 0   Flexion 140   Forearm ROM   Supination 80 (85)   Pronation  90    (measured in pounds)    Average Strength L:107, R:107   Treatment Interventions (OT)   Interventions Therapeutic Procedure/Exercise   Self Care/Home Management   Self Care 1 HEP   Self Care 2 precautions   Self Care 2 - Details can begin light resistance to bicep, avoid heavy lifting until MD f/u 5/30/24   PTRx Self Care 1 Scar Massage - 3 vector   PTRx Self Care 1 - Details 3 vector scar mobs   Skilled Intervention to protect healing tendon repair and decrease scar adhesions   Patient Response/Progress verbalized understanding   Therapeutic Procedure/Exercise   Therapeutic Procedure: strength, endurance, ROM, flexibillity minutes (74109) 20   Ther Proc 1   strengthening   Ther Proc 1 - Details 30-50 reps with stress ball   PTRx Ther Proc 1 Elbow Active Range of Motion Combined Extension and Flexion   PTRx Ther Proc 1 - Details 10 reps   PTRx Ther Proc 2 Forearm Active Range of Motion Combined Pronation and Supination   PTRx Ther Proc 2 - Details 10 reps   PTRx Ther Proc 3 Wrist Active Range of Motion Extension and Flexion Combined   PTRx Ther Proc 3 - Details 10 reps   PTRx Ther Proc 4 Isometric Shoulder External Rotation   PTRx Ther Proc 4 - Details 10 reps gentle resistance   PTRx Ther Proc 5 Shoulder Isometric Internal Rotation   PTRx Ther Proc 5 - Details 10 reps gentle resistance   PTRx Ther Proc 6 Isometric Shoulder Abduction    PTRx Ther Proc 6 - Details 10 reps gentle resistance   PTRx Ther Proc 7 Shoulder Isometric Adduction   PTRx Ther Proc 7 - Details 10 reps gentle resistance   Skilled Intervention to increase flexibility and strength   Patient Response/Progress demonstrated understanding   PTRx Ther Proc 8 Elbow Strengthening Isotonic Flexion   PTRx Ther Proc 8 - Details 15 reps 5# max   PTRx Ther Proc 9 Bent Over Tricep Extension   PTRx Ther Proc 9 - Details 15 reps 5# max   PTRx Ther Proc 10 Wrist Strengthening Isotonic Extension   PTRx Ther Proc 10 - Details 15 reps 5# max   PTRx Ther Proc 11 Wrist Strengthening Isotonic Flexion   PTRx Ther Proc 11 - Details 15 reps 5# max   Education   Learner/Method Patient;Demonstration;Pictures/Video   Plan   Home program add elbow, forearm and wrist isotonics with light weights, no > than 5#   Plan for next session cont avoidance of heavy lifting until MD f/u 5/30/24   Total Session Time   Timed Code Treatment Minutes 20   Total Treatment Time (sum of timed and untimed services) 20     Functional Outcome Measure:  Upper Extremity Functional Index  SCORE:   Column Totals: 75/80  (A lower score indicates greater disability.)        DISCHARGE  Reason for Discharge: Patient has met all goals.    Equipment Issued:  NA    Discharge Plan: Patient to continue home program.    Referring Provider:  Fouzia Izaguirre

## 2024-05-24 NOTE — PROGRESS NOTES
CHIEF COMPLAINT: Status post Right distal biceps repair     DATE OF SURGERY: 1/30/2024     HISTORY OF PRESENT ILLNESS: Ross is an extremely pleasant 38 year-old male who is 4 months status post the above procedure. Doing well but wondering about when he will be off all restrictions. SANE R - 50 L - 100    EXAM:  Pleasant adult 38 year old in no distress.  Respirations even and unlabored.  Right upper extremity: Incision healed. No erythema. No drainage. Sens intact Ax/Musc/Med/Rad/Uln nerves. Motor intact EPL, FPL, and Intrinsics.   Full passive range of motion about the elbow from full extension to able to touch the shoulder to 140 degrees, full passive pronation and supination.  Palpable biceps tendon distally      ASSESSMENT:  1.  4 months status post above procedure    PLAN:  I discussed with the patient that things are going well today.  He is going to continue to gradually strengthen to tolerance. Patient was given updated work restrictions today with anticipation of return to full duty in 6 weeks.  He is can continue with occupational therapy.  Followup as needed    Fouzia Izaguirre  Orthopedic Surgery Sports Medicine and Shoulder Surgery

## 2024-05-30 ENCOUNTER — OFFICE VISIT (OUTPATIENT)
Dept: ORTHOPEDICS | Facility: CLINIC | Age: 39
End: 2024-05-30
Payer: OTHER MISCELLANEOUS

## 2024-05-30 DIAGNOSIS — Z47.89 ORTHOPEDIC AFTERCARE: Primary | ICD-10-CM

## 2024-05-30 PROCEDURE — 99212 OFFICE O/P EST SF 10 MIN: CPT | Performed by: ORTHOPAEDIC SURGERY

## 2024-05-30 NOTE — LETTER
May 30, 2024      Ross Washington  [address]  [place] [address]        To Whom It May Concern:    Ross Washington is under my professional care for his right arm. He had surgery on 1/30/24.   Limit lifting and carrying to 10 pound.  No repetitive push, pull, carry with right arm.    These restrictions should stay in place until 7/12/24. After that date, he may return to work fully with no restrictions.         Sincerely,      DANIEL CHISHOLM

## 2024-05-30 NOTE — LETTER
5/30/2024         RE: Ross Washington  [address]  [place] [address]        Dear Colleague,    Thank you for referring your patient, Ross Washington, to the [facility]. Please see a copy of my visit note below.    CHIEF COMPLAINT: Status post Right distal biceps repair     DATE OF SURGERY: 1/30/2024     HISTORY OF PRESENT ILLNESS: Ross is an extremely pleasant 38 year-old male who is 4 months status post the above procedure. Doing well but wondering about when he will be off all restrictions. SANE R - 50 L - 100    EXAM:  Pleasant adult 38 year old in no distress.  Respirations even and unlabored.  Right upper extremity: Incision healed. No erythema. No drainage. Sens intact Ax/Musc/Med/Rad/Uln nerves. Motor intact EPL, FPL, and Intrinsics.   Full passive range of motion about the elbow from full extension to able to touch the shoulder to 140 degrees, full passive pronation and supination.  Palpable biceps tendon distally      ASSESSMENT:  1.  4 months status post above procedure    PLAN:  I discussed with the patient that things are going well today.  He is going to continue to gradually strengthen to tolerance. Patient was given updated work restrictions today with anticipation of return to full duty in 6 weeks.  He is can continue with occupational therapy.  Followup as needed    Fouzia Chisholm  Orthopedic Surgery Sports Medicine and Shoulder Surgery      Again, thank you for allowing me to participate in the care of your patient.        Sincerely,        FOUZIA CHISHOLM MD

## 2025-03-02 ENCOUNTER — HEALTH MAINTENANCE LETTER (OUTPATIENT)
Age: 40
End: 2025-03-02

## 2025-06-10 ENCOUNTER — TELEPHONE (OUTPATIENT)
Dept: FAMILY MEDICINE | Facility: CLINIC | Age: 40
End: 2025-06-10

## 2025-06-10 NOTE — TELEPHONE ENCOUNTER
Patient Quality Outreach    Patient is due for the following:   Hypertension -  Hypertension follow-up visit  Physical Preventive Adult Physical      Topic Date Due    Pneumococcal Vaccine (1 of 2 - PCV) Never done    Hepatitis B Vaccine (1 of 3 - 19+ 3-dose series) Never done    COVID-19 Vaccine (1 - 2024-25 season) Never done       Action(s) Taken:   Schedule a Adult Preventative    Type of outreach:    Sent [employer] message.    Questions for provider review:    None         Bekah Samuel CMA  Chart routed to Care Team.

## (undated) DEVICE — DRAPE STERI TOWEL LG 1010

## (undated) DEVICE — PREP BRUSH SURG SCRUB CHLOROXYLENOL PCMX 3% 371163

## (undated) DEVICE — SU VICRYL 0 CT 36" J358H

## (undated) DEVICE — DRAPE STOCKINETTE IMPERVIOUS 12" 1587

## (undated) DEVICE — SU VICRYL 0 CT-1 27" UND J260H

## (undated) DEVICE — SLING ARM XLG 79-99158

## (undated) DEVICE — SOL WATER IRRIG 1000ML BOTTLE 2F7114

## (undated) DEVICE — SOL HYDROGEN PEROXIDE 3% 4OZ BOTTLE F0010

## (undated) DEVICE — SOL NACL 0.9% IRRIG 1000ML BOTTLE 2F7124

## (undated) DEVICE — BNDG ELASTIC 4" DBL LENGTH UNSTERILE 6611-14

## (undated) DEVICE — BLADE KNIFE SURG 10 371110

## (undated) DEVICE — GOWN IMPERVIOUS SPECIALTY XLG/XLONG 32474

## (undated) DEVICE — GLOVE BIOGEL PI MICRO INDICATOR UNDERGLOVE SZ 6.5 48965

## (undated) DEVICE — LINEN TOWEL PACK X5 5464

## (undated) DEVICE — SU MONOCRYL 3-0 PS-1 27" Y936H

## (undated) DEVICE — SU MONOCRYL 2-0 SH 27" UND Y417H

## (undated) DEVICE — GLOVE BIOGEL PI ULTRATOUCH G SZ 6.5 42165

## (undated) DEVICE — Device

## (undated) DEVICE — PREP CHLORAPREP 26ML TINTED HI-LITE ORANGE 930815

## (undated) DEVICE — ESU GROUND PAD ADULT W/CORD E7507

## (undated) DEVICE — CAST PADDING 4" STERILE 9044S

## (undated) DEVICE — LINEN ORTHO PACK 5446

## (undated) DEVICE — RESTRAINT LIMB HOLDER ANKLE/WRIST FOAM W/QUICK RELEASE 2533

## (undated) DEVICE — CAST PLASTER SPLINT 5X30" 7395

## (undated) DEVICE — COVER CAMERA IN-LIGHT DISP LT-C02

## (undated) DEVICE — DRSG AQUACEL AG HYDROFIBER 3.5X6" 422604

## (undated) DEVICE — SUCTION TIP YANKAUER STR K87

## (undated) DEVICE — CLIP HORIZON MED BLUE 002200

## (undated) DEVICE — DRAPE POUCH INSTRUMENT 1018

## (undated) DEVICE — DRAPE C-ARM MINI 54X85" 07-CA600

## (undated) DEVICE — SU VICRYL 2-0 CT-1 27" UND J259H

## (undated) DEVICE — ESU PENCIL W/SMOKE EVAC NEPTUNE STRYKER 0703-046-000

## (undated) DEVICE — CAST PADDING 4" UNSTERILE 9044

## (undated) DEVICE — STRAP KNEE/BODY 31143004

## (undated) DEVICE — SU DERMABOND ADVANCED .7ML DNX12

## (undated) DEVICE — SUCTION MANIFOLD NEPTUNE 2 SYS 4 PORT 0702-020-000

## (undated) RX ORDER — DEXAMETHASONE SODIUM PHOSPHATE 4 MG/ML
INJECTION, SOLUTION INTRA-ARTICULAR; INTRALESIONAL; INTRAMUSCULAR; INTRAVENOUS; SOFT TISSUE
Status: DISPENSED
Start: 2024-01-30

## (undated) RX ORDER — OXYCODONE HYDROCHLORIDE 5 MG/1
TABLET ORAL
Status: DISPENSED
Start: 2024-01-30

## (undated) RX ORDER — CEFAZOLIN SODIUM/WATER 3 G/30 ML
SYRINGE (ML) INTRAVENOUS
Status: DISPENSED
Start: 2024-01-30

## (undated) RX ORDER — ACETAMINOPHEN 500 MG
TABLET ORAL
Status: DISPENSED
Start: 2024-01-30

## (undated) RX ORDER — PROPOFOL 10 MG/ML
INJECTION, EMULSION INTRAVENOUS
Status: DISPENSED
Start: 2024-01-30

## (undated) RX ORDER — FENTANYL CITRATE 50 UG/ML
INJECTION, SOLUTION INTRAMUSCULAR; INTRAVENOUS
Status: DISPENSED
Start: 2024-01-30

## (undated) RX ORDER — ONDANSETRON 2 MG/ML
INJECTION INTRAMUSCULAR; INTRAVENOUS
Status: DISPENSED
Start: 2024-01-30

## (undated) RX ORDER — IPRATROPIUM BROMIDE AND ALBUTEROL SULFATE 2.5; .5 MG/3ML; MG/3ML
SOLUTION RESPIRATORY (INHALATION)
Status: DISPENSED
Start: 2024-01-30

## (undated) RX ORDER — ALBUTEROL SULFATE 90 UG/1
AEROSOL, METERED RESPIRATORY (INHALATION)
Status: DISPENSED
Start: 2024-01-30

## (undated) RX ORDER — ALBUTEROL SULFATE 0.83 MG/ML
SOLUTION RESPIRATORY (INHALATION)
Status: DISPENSED
Start: 2024-01-30